# Patient Record
Sex: FEMALE | Race: WHITE | NOT HISPANIC OR LATINO | Employment: FULL TIME | URBAN - METROPOLITAN AREA
[De-identification: names, ages, dates, MRNs, and addresses within clinical notes are randomized per-mention and may not be internally consistent; named-entity substitution may affect disease eponyms.]

---
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
## 2022-01-01 ENCOUNTER — APPOINTMENT (OUTPATIENT)
Dept: CT IMAGING | Facility: HOSPITAL | Age: 22
End: 2022-01-01
Attending: EMERGENCY MEDICINE
Payer: COMMERCIAL

## 2022-01-01 ENCOUNTER — HOSPITAL ENCOUNTER (EMERGENCY)
Facility: HOSPITAL | Age: 22
Discharge: HOME | End: 2022-01-01
Attending: EMERGENCY MEDICINE | Admitting: EMERGENCY MEDICINE
Payer: COMMERCIAL

## 2022-01-01 VITALS
SYSTOLIC BLOOD PRESSURE: 119 MMHG | WEIGHT: 117 LBS | BODY MASS INDEX: 22.29 KG/M2 | RESPIRATION RATE: 18 BRPM | OXYGEN SATURATION: 97 % | TEMPERATURE: 97.1 F | HEART RATE: 87 BPM | DIASTOLIC BLOOD PRESSURE: 81 MMHG

## 2022-01-01 DIAGNOSIS — S06.0X0A CONCUSSION WITHOUT LOSS OF CONSCIOUSNESS, INITIAL ENCOUNTER: ICD-10-CM

## 2022-01-01 PROCEDURE — 99284 EMERGENCY DEPT VISIT MOD MDM: CPT | Mod: 25

## 2022-01-01 PROCEDURE — 70450 CT HEAD/BRAIN W/O DYE: CPT

## 2022-01-01 PROCEDURE — 250N000011 HC RX IP 250 OP 636: Performed by: EMERGENCY MEDICINE

## 2022-01-01 RX ORDER — ONDANSETRON 4 MG/1
4 TABLET, ORALLY DISINTEGRATING ORAL ONCE
Status: COMPLETED | OUTPATIENT
Start: 2022-01-01 | End: 2022-01-01

## 2022-01-01 RX ADMIN — ONDANSETRON 4 MG: 4 TABLET, ORALLY DISINTEGRATING ORAL at 19:38
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
## 2022-01-02 NOTE — DISCHARGE INSTRUCTIONS
Your symptoms today appear to be due to a concussion.  Fortunately your CAT scan was normal.  Follow-up with the concussion clinic as soon as possible for further evaluation and return to the ER if you have any worsening symptoms or other concerns.

## 2022-05-15 ENCOUNTER — HOSPITAL ENCOUNTER (EMERGENCY)
Facility: CLINIC | Age: 22
Discharge: HOME | End: 2022-05-15
Attending: NURSE PRACTITIONER | Admitting: NURSE PRACTITIONER
Payer: COMMERCIAL

## 2022-05-15 VITALS
TEMPERATURE: 97.5 F | RESPIRATION RATE: 16 BRPM | BODY MASS INDEX: 22.66 KG/M2 | OXYGEN SATURATION: 98 % | WEIGHT: 120 LBS | SYSTOLIC BLOOD PRESSURE: 115 MMHG | HEIGHT: 61 IN | DIASTOLIC BLOOD PRESSURE: 85 MMHG | HEART RATE: 75 BPM

## 2022-05-15 DIAGNOSIS — H69.90 ETD (EUSTACHIAN TUBE DYSFUNCTION): ICD-10-CM

## 2022-05-15 PROCEDURE — 99213 OFFICE O/P EST LOW 20 MIN: CPT | Performed by: NURSE PRACTITIONER

## 2022-05-15 PROCEDURE — G0463 HOSPITAL OUTPT CLINIC VISIT: HCPCS | Performed by: NURSE PRACTITIONER

## 2022-05-15 RX ORDER — SERTRALINE HYDROCHLORIDE 100 MG/1
100 TABLET, FILM COATED ORAL DAILY
COMMUNITY
Start: 2022-03-25

## 2022-05-15 RX ORDER — NORETHINDRONE ACETATE AND ETHINYL ESTRADIOL AND FERROUS FUMARATE 1.5-30(21)
1 KIT ORAL DAILY
COMMUNITY
Start: 2022-05-14

## 2022-05-15 ASSESSMENT — ENCOUNTER SYMPTOMS
SHORTNESS OF BREATH: 0
TROUBLE SWALLOWING: 0
HEADACHES: 0
VOMITING: 0
LIGHT-HEADEDNESS: 0
NUMBNESS: 0
ABDOMINAL PAIN: 0
SINUS PRESSURE: 1
RHINORRHEA: 1
JOINT SWELLING: 0
MYALGIAS: 0
SINUS PAIN: 0
EYE REDNESS: 0
SORE THROAT: 0
FATIGUE: 0
FEVER: 0
ARTHRALGIAS: 0
WEAKNESS: 0
EYE DISCHARGE: 0
NAUSEA: 0
CHILLS: 0
COUGH: 0
DIZZINESS: 0

## 2022-05-15 NOTE — ED TRIAGE NOTES
Ear pain since Thursday.  Pt reports congestion, neg home covid test. Unvaccinated. Informed of no visitors due to PUI status.      Triage Assessment     Row Name 05/15/22 9969       Triage Assessment (Adult)    Airway WDL WDL       Respiratory WDL    Respiratory WDL WDL       Skin Circulation/Temperature WDL    Skin Circulation/Temperature WDL WDL       Cardiac WDL    Cardiac WDL WDL       Peripheral/Neurovascular WDL    Peripheral Neurovascular WDL WDL       Cognitive/Neuro/Behavioral WDL    Cognitive/Neuro/Behavioral WDL WDL

## 2022-05-15 NOTE — ED PROVIDER NOTES
History     Chief Complaint   Patient presents with     Otalgia     Ear pain since Thursday.      HPI  Cornelio Ramos is a 22 year old female who presents the urgent care for evaluation of bilateral ear pain for 3 days.  Left is worse than right.  Accompanying nasal congestion and rhinorrhea.  Negative home COVID test.  Has not been using over-the-counter options for symptoms.  No known sick contacts.  No fever, headache, tinnitus, sore throat, cough, abdominal pain.    Allergies:  Allergies   Allergen Reactions     Amoxicillin        Problem List:    Patient Active Problem List    Diagnosis Date Noted     No significant medical problems 01/01/2022     Priority: Medium     Knee pain 04/24/2014     Priority: Medium     Short stature 09/06/2013     Priority: Medium     ADHD, predominantly inattentive type 06/29/2012     Priority: Medium     Generalized anxiety disorder 06/29/2012     Priority: Medium        Past Medical History:    Past Medical History:   Diagnosis Date     ADHD      Anxiety      Depression        Past Surgical History:    Past Surgical History:   Procedure Laterality Date     PE TUBES         Family History:    Family History   Problem Relation Age of Onset     Allergies Father         seasonal     Psoriasis Paternal Grandfather      Lupus Other         Extended maternal family members     Rheumatoid Arthritis Other         Extended maternal family members     Psoriasis Paternal Aunt      Psoriasis Cousin        Social History:  Marital Status:  Single [1]  Social History     Tobacco Use     Smoking status: Never Smoker     Smokeless tobacco: Never Used   Substance Use Topics     Alcohol use: No     Drug use: No        Medications:    albuterol (PROAIR HFA) 108 (90 BASE) MCG/ACT Inhaler  ESCITALOPRAM OXALATE PO  JUNEL FE 1.5/30 1.5-30 MG-MCG tablet  sertraline (ZOLOFT) 100 MG tablet          Review of Systems   Constitutional: Negative for chills, fatigue and fever.   HENT: Positive for  "congestion, ear pain, rhinorrhea and sinus pressure. Negative for ear discharge, sinus pain, sore throat and trouble swallowing.    Eyes: Negative for discharge and redness.   Respiratory: Negative for cough and shortness of breath.    Cardiovascular: Negative for chest pain.   Gastrointestinal: Negative for abdominal pain, nausea and vomiting.   Musculoskeletal: Negative for arthralgias, joint swelling and myalgias.   Skin: Negative for rash.   Neurological: Negative for dizziness, weakness, light-headedness, numbness and headaches.   All other systems reviewed and are negative.      Physical Exam   BP: 115/85  Pulse: 75  Temp: 97.5  F (36.4  C)  Resp: 16  Height: 154.9 cm (5' 1\")  Weight: 54.4 kg (120 lb)  SpO2: 98 %      Physical Exam  Constitutional:       General: She is not in acute distress.     Appearance: She is well-developed. She is not diaphoretic.   HENT:      Right Ear: Tympanic membrane and ear canal normal.      Left Ear: Tympanic membrane and ear canal normal.   Eyes:      Conjunctiva/sclera: Conjunctivae normal.      Pupils: Pupils are equal, round, and reactive to light.   Cardiovascular:      Rate and Rhythm: Normal rate and regular rhythm.      Pulses: Normal pulses.   Pulmonary:      Effort: Pulmonary effort is normal. No respiratory distress.      Breath sounds: Normal breath sounds and air entry. No decreased air movement. No decreased breath sounds, wheezing or rhonchi.   Musculoskeletal:         General: Normal range of motion.      Cervical back: Normal range of motion and neck supple.   Skin:     General: Skin is warm.      Capillary Refill: Capillary refill takes less than 2 seconds.   Neurological:      General: No focal deficit present.      Mental Status: She is alert and oriented to person, place, and time.   Psychiatric:         Mood and Affect: Mood normal.         ED Course                 Procedures    No results found for this or any previous visit (from the past 24 " hour(s)).    Medications - No data to display    Assessments & Plan (with Medical Decision Making)   Cornelio Ramos is a 22 year old female who presents the urgent care for evaluation of bilateral ear pain for 3 days.  Left is worse than right.  Accompanying nasal congestion and rhinorrhea. Vitals normal. No worrisome findings on physical exam, no sign of AOM. Discussed eustachian tube dysfunction. May use over the counter medications as needed and appropriate. Increase rest and hydration. Return precautions reviewed, all questions answered. Patient agreeable to plan of care and discharged in good condition.    I have reviewed the nursing notes.    I have reviewed the findings, diagnosis, plan and need for follow up with the patient.    Discharge Medication List as of 5/15/2022  3:53 PM          Final diagnoses:   ETD (eustachian tube dysfunction)       5/15/2022   [facility] EMERGENCY DEPT     Kavita Franz, APRN CNP  05/15/22 1845

## 2022-10-14 ENCOUNTER — ANESTHESIA EVENT (OUTPATIENT)
Dept: SURGERY | Facility: CLINIC | Age: 22
End: 2022-10-14
Payer: COMMERCIAL

## 2022-10-14 ENCOUNTER — ANESTHESIA (OUTPATIENT)
Dept: SURGERY | Facility: CLINIC | Age: 22
End: 2022-10-14
Payer: COMMERCIAL

## 2022-10-14 ENCOUNTER — APPOINTMENT (OUTPATIENT)
Dept: CT IMAGING | Facility: CLINIC | Age: 22
End: 2022-10-14
Attending: FAMILY MEDICINE
Payer: COMMERCIAL

## 2022-10-14 ENCOUNTER — TELEPHONE (OUTPATIENT)
Dept: SURGERY | Facility: CLINIC | Age: 22
End: 2022-10-14

## 2022-10-14 ENCOUNTER — HOSPITAL ENCOUNTER (OUTPATIENT)
Facility: CLINIC | Age: 22
Discharge: HOME | End: 2022-10-14
Attending: FAMILY MEDICINE | Admitting: SURGERY
Payer: COMMERCIAL

## 2022-10-14 VITALS
SYSTOLIC BLOOD PRESSURE: 118 MMHG | HEART RATE: 101 BPM | HEIGHT: 61 IN | OXYGEN SATURATION: 99 % | TEMPERATURE: 98.8 F | WEIGHT: 108 LBS | BODY MASS INDEX: 20.39 KG/M2 | RESPIRATION RATE: 16 BRPM | DIASTOLIC BLOOD PRESSURE: 86 MMHG

## 2022-10-14 DIAGNOSIS — K35.30 ACUTE APPENDICITIS WITH LOCALIZED PERITONITIS, WITHOUT PERFORATION, ABSCESS, OR GANGRENE: Primary | ICD-10-CM

## 2022-10-14 LAB
ALBUMIN SERPL BCG-MCNC: 4.2 G/DL (ref 3.5–5.2)
ALBUMIN UR-MCNC: NEGATIVE MG/DL
ALP SERPL-CCNC: 69 U/L (ref 35–104)
ALT SERPL W P-5'-P-CCNC: 15 U/L (ref 10–35)
ANION GAP SERPL CALCULATED.3IONS-SCNC: 11 MMOL/L (ref 7–15)
APPEARANCE UR: ABNORMAL
AST SERPL W P-5'-P-CCNC: 18 U/L (ref 10–35)
BASOPHILS # BLD AUTO: 0.1 10E3/UL (ref 0–0.2)
BASOPHILS NFR BLD AUTO: 1 %
BILIRUB SERPL-MCNC: 0.9 MG/DL
BILIRUB UR QL STRIP: NEGATIVE
BUN SERPL-MCNC: 11.4 MG/DL (ref 6–20)
CALCIUM SERPL-MCNC: 9 MG/DL (ref 8.6–10)
CHLORIDE SERPL-SCNC: 102 MMOL/L (ref 98–107)
COLOR UR AUTO: YELLOW
CREAT SERPL-MCNC: 0.78 MG/DL (ref 0.51–0.95)
DEPRECATED HCO3 PLAS-SCNC: 25 MMOL/L (ref 22–29)
EOSINOPHIL # BLD AUTO: 0.1 10E3/UL (ref 0–0.7)
EOSINOPHIL NFR BLD AUTO: 1 %
ERYTHROCYTE [DISTWIDTH] IN BLOOD BY AUTOMATED COUNT: 11.9 % (ref 10–15)
GFR SERPL CREATININE-BSD FRML MDRD: >90 ML/MIN/1.73M2
GLUCOSE SERPL-MCNC: 85 MG/DL (ref 70–99)
GLUCOSE UR STRIP-MCNC: NEGATIVE MG/DL
HCG UR QL: NEGATIVE
HCT VFR BLD AUTO: 38.8 % (ref 35–47)
HGB BLD-MCNC: 13.2 G/DL (ref 11.7–15.7)
HGB UR QL STRIP: NEGATIVE
HOLD SPECIMEN: NORMAL
IMM GRANULOCYTES # BLD: 0 10E3/UL
IMM GRANULOCYTES NFR BLD: 0 %
KETONES UR STRIP-MCNC: NEGATIVE MG/DL
LEUKOCYTE ESTERASE UR QL STRIP: ABNORMAL
LIPASE SERPL-CCNC: 29 U/L (ref 13–60)
LYMPHOCYTES # BLD AUTO: 2.2 10E3/UL (ref 0.8–5.3)
LYMPHOCYTES NFR BLD AUTO: 18 %
MCH RBC QN AUTO: 31.4 PG (ref 26.5–33)
MCHC RBC AUTO-ENTMCNC: 34 G/DL (ref 31.5–36.5)
MCV RBC AUTO: 92 FL (ref 78–100)
MONOCYTES # BLD AUTO: 0.6 10E3/UL (ref 0–1.3)
MONOCYTES NFR BLD AUTO: 5 %
MUCOUS THREADS #/AREA URNS LPF: PRESENT /LPF
NEUTROPHILS # BLD AUTO: 9.5 10E3/UL (ref 1.6–8.3)
NEUTROPHILS NFR BLD AUTO: 75 %
NITRATE UR QL: NEGATIVE
NRBC # BLD AUTO: 0 10E3/UL
NRBC BLD AUTO-RTO: 0 /100
PH UR STRIP: 6 [PH] (ref 5–7)
PLATELET # BLD AUTO: 262 10E3/UL (ref 150–450)
POTASSIUM SERPL-SCNC: 3.7 MMOL/L (ref 3.4–5.3)
PROT SERPL-MCNC: 6.8 G/DL (ref 6.4–8.3)
RBC # BLD AUTO: 4.21 10E6/UL (ref 3.8–5.2)
RBC URINE: 1 /HPF
SARS-COV-2 RNA RESP QL NAA+PROBE: NEGATIVE
SODIUM SERPL-SCNC: 138 MMOL/L (ref 136–145)
SP GR UR STRIP: 1.02 (ref 1–1.03)
SQUAMOUS EPITHELIAL: 13 /HPF
UROBILINOGEN UR STRIP-MCNC: NORMAL MG/DL
WBC # BLD AUTO: 12.4 10E3/UL (ref 4–11)
WBC URINE: 10 /HPF

## 2022-10-14 PROCEDURE — 250N000013 HC RX MED GY IP 250 OP 250 PS 637: Performed by: NURSE ANESTHETIST, CERTIFIED REGISTERED

## 2022-10-14 PROCEDURE — 710N000009 HC RECOVERY PHASE 1, LEVEL 1, PER MIN: Performed by: SURGERY

## 2022-10-14 PROCEDURE — 370N000017 HC ANESTHESIA TECHNICAL FEE, PER MIN: Performed by: SURGERY

## 2022-10-14 PROCEDURE — C9803 HOPD COVID-19 SPEC COLLECT: HCPCS | Performed by: FAMILY MEDICINE

## 2022-10-14 PROCEDURE — 710N000012 HC RECOVERY PHASE 2, PER MINUTE: Performed by: SURGERY

## 2022-10-14 PROCEDURE — 271N000001 HC OR GENERAL SUPPLY NON-STERILE: Performed by: SURGERY

## 2022-10-14 PROCEDURE — 250N000009 HC RX 250: Performed by: SURGERY

## 2022-10-14 PROCEDURE — 99221 1ST HOSP IP/OBS SF/LOW 40: CPT | Mod: 57 | Performed by: SURGERY

## 2022-10-14 PROCEDURE — 250N000011 HC RX IP 250 OP 636

## 2022-10-14 PROCEDURE — 999N000141 HC STATISTIC PRE-PROCEDURE NURSING ASSESSMENT: Performed by: SURGERY

## 2022-10-14 PROCEDURE — 96365 THER/PROPH/DIAG IV INF INIT: CPT | Performed by: FAMILY MEDICINE

## 2022-10-14 PROCEDURE — 99285 EMERGENCY DEPT VISIT HI MDM: CPT | Mod: 25 | Performed by: FAMILY MEDICINE

## 2022-10-14 PROCEDURE — 80053 COMPREHEN METABOLIC PANEL: CPT | Performed by: FAMILY MEDICINE

## 2022-10-14 PROCEDURE — 44970 LAPAROSCOPY APPENDECTOMY: CPT | Performed by: SURGERY

## 2022-10-14 PROCEDURE — 87635 SARS-COV-2 COVID-19 AMP PRB: CPT | Performed by: FAMILY MEDICINE

## 2022-10-14 PROCEDURE — 250N000009 HC RX 250: Performed by: FAMILY MEDICINE

## 2022-10-14 PROCEDURE — 250N000011 HC RX IP 250 OP 636: Performed by: NURSE ANESTHETIST, CERTIFIED REGISTERED

## 2022-10-14 PROCEDURE — 250N000011 HC RX IP 250 OP 636: Performed by: FAMILY MEDICINE

## 2022-10-14 PROCEDURE — 81001 URINALYSIS AUTO W/SCOPE: CPT | Performed by: FAMILY MEDICINE

## 2022-10-14 PROCEDURE — 96375 TX/PRO/DX INJ NEW DRUG ADDON: CPT | Performed by: FAMILY MEDICINE

## 2022-10-14 PROCEDURE — 74177 CT ABD & PELVIS W/CONTRAST: CPT

## 2022-10-14 PROCEDURE — 96366 THER/PROPH/DIAG IV INF ADDON: CPT | Performed by: FAMILY MEDICINE

## 2022-10-14 PROCEDURE — 250N000009 HC RX 250: Performed by: NURSE ANESTHETIST, CERTIFIED REGISTERED

## 2022-10-14 PROCEDURE — 81025 URINE PREGNANCY TEST: CPT | Performed by: FAMILY MEDICINE

## 2022-10-14 PROCEDURE — 85025 COMPLETE CBC W/AUTO DIFF WBC: CPT | Performed by: FAMILY MEDICINE

## 2022-10-14 PROCEDURE — 258N000003 HC RX IP 258 OP 636: Performed by: NURSE ANESTHETIST, CERTIFIED REGISTERED

## 2022-10-14 PROCEDURE — 250N000025 HC SEVOFLURANE, PER MIN: Performed by: SURGERY

## 2022-10-14 PROCEDURE — 360N000076 HC SURGERY LEVEL 3, PER MIN: Performed by: SURGERY

## 2022-10-14 PROCEDURE — 99285 EMERGENCY DEPT VISIT HI MDM: CPT | Performed by: FAMILY MEDICINE

## 2022-10-14 PROCEDURE — 83690 ASSAY OF LIPASE: CPT | Performed by: FAMILY MEDICINE

## 2022-10-14 PROCEDURE — 88304 TISSUE EXAM BY PATHOLOGIST: CPT | Mod: TC | Performed by: SURGERY

## 2022-10-14 PROCEDURE — 258N000003 HC RX IP 258 OP 636: Performed by: FAMILY MEDICINE

## 2022-10-14 PROCEDURE — 36415 COLL VENOUS BLD VENIPUNCTURE: CPT | Performed by: FAMILY MEDICINE

## 2022-10-14 PROCEDURE — 272N000001 HC OR GENERAL SUPPLY STERILE: Performed by: SURGERY

## 2022-10-14 RX ORDER — SODIUM CHLORIDE 9 MG/ML
1000 INJECTION, SOLUTION INTRAVENOUS CONTINUOUS
Status: DISCONTINUED | OUTPATIENT
Start: 2022-10-14 | End: 2022-10-24 | Stop reason: HOSPADM

## 2022-10-14 RX ORDER — HYDROMORPHONE HYDROCHLORIDE 1 MG/ML
0.5 INJECTION, SOLUTION INTRAMUSCULAR; INTRAVENOUS; SUBCUTANEOUS
Status: DISCONTINUED | OUTPATIENT
Start: 2022-10-14 | End: 2022-10-24 | Stop reason: HOSPADM

## 2022-10-14 RX ORDER — KETOROLAC TROMETHAMINE 15 MG/ML
15 INJECTION, SOLUTION INTRAMUSCULAR; INTRAVENOUS ONCE
Status: COMPLETED | OUTPATIENT
Start: 2022-10-14 | End: 2022-10-14

## 2022-10-14 RX ORDER — KETAMINE HYDROCHLORIDE 10 MG/ML
INJECTION INTRAMUSCULAR; INTRAVENOUS PRN
Status: DISCONTINUED | OUTPATIENT
Start: 2022-10-14 | End: 2022-10-14

## 2022-10-14 RX ORDER — ONDANSETRON 2 MG/ML
4 INJECTION INTRAMUSCULAR; INTRAVENOUS EVERY 30 MIN PRN
Status: DISCONTINUED | OUTPATIENT
Start: 2022-10-14 | End: 2022-10-24 | Stop reason: HOSPADM

## 2022-10-14 RX ORDER — PROPOFOL 10 MG/ML
INJECTION, EMULSION INTRAVENOUS CONTINUOUS PRN
Status: DISCONTINUED | OUTPATIENT
Start: 2022-10-14 | End: 2022-10-14

## 2022-10-14 RX ORDER — OXYCODONE HYDROCHLORIDE 5 MG/1
5-10 TABLET ORAL EVERY 4 HOURS PRN
Qty: 10 TABLET | Refills: 0 | Status: SHIPPED | OUTPATIENT
Start: 2022-10-14

## 2022-10-14 RX ORDER — FENTANYL CITRATE 50 UG/ML
50 INJECTION, SOLUTION INTRAMUSCULAR; INTRAVENOUS EVERY 5 MIN PRN
Status: DISCONTINUED | OUTPATIENT
Start: 2022-10-14 | End: 2022-10-14 | Stop reason: HOSPADM

## 2022-10-14 RX ORDER — NALOXONE HYDROCHLORIDE 0.4 MG/ML
0.4 INJECTION, SOLUTION INTRAMUSCULAR; INTRAVENOUS; SUBCUTANEOUS
Status: DISCONTINUED | OUTPATIENT
Start: 2022-10-14 | End: 2022-10-24 | Stop reason: HOSPADM

## 2022-10-14 RX ORDER — IOPAMIDOL 755 MG/ML
48 INJECTION, SOLUTION INTRAVASCULAR ONCE
Status: COMPLETED | OUTPATIENT
Start: 2022-10-14 | End: 2022-10-14

## 2022-10-14 RX ORDER — ONDANSETRON 4 MG/1
4 TABLET, ORALLY DISINTEGRATING ORAL EVERY 30 MIN PRN
Status: DISCONTINUED | OUTPATIENT
Start: 2022-10-14 | End: 2022-10-24 | Stop reason: HOSPADM

## 2022-10-14 RX ORDER — ONDANSETRON 2 MG/ML
4 INJECTION INTRAMUSCULAR; INTRAVENOUS ONCE
Status: COMPLETED | OUTPATIENT
Start: 2022-10-14 | End: 2022-10-14

## 2022-10-14 RX ORDER — PROPOFOL 10 MG/ML
INJECTION, EMULSION INTRAVENOUS PRN
Status: DISCONTINUED | OUTPATIENT
Start: 2022-10-14 | End: 2022-10-14

## 2022-10-14 RX ORDER — ACETAMINOPHEN 325 MG/1
975 TABLET ORAL ONCE
Status: COMPLETED | OUTPATIENT
Start: 2022-10-14 | End: 2022-10-14

## 2022-10-14 RX ORDER — CEFTRIAXONE 2 G/1
2 INJECTION, POWDER, FOR SOLUTION INTRAMUSCULAR; INTRAVENOUS ONCE
Status: COMPLETED | OUTPATIENT
Start: 2022-10-14 | End: 2022-10-14

## 2022-10-14 RX ORDER — BUPIVACAINE HYDROCHLORIDE AND EPINEPHRINE 5; 5 MG/ML; UG/ML
INJECTION, SOLUTION PERINEURAL PRN
Status: DISCONTINUED | OUTPATIENT
Start: 2022-10-14 | End: 2022-10-14 | Stop reason: HOSPADM

## 2022-10-14 RX ORDER — SODIUM CHLORIDE, SODIUM LACTATE, POTASSIUM CHLORIDE, CALCIUM CHLORIDE 600; 310; 30; 20 MG/100ML; MG/100ML; MG/100ML; MG/100ML
INJECTION, SOLUTION INTRAVENOUS CONTINUOUS
Status: DISCONTINUED | OUTPATIENT
Start: 2022-10-14 | End: 2022-10-24 | Stop reason: HOSPADM

## 2022-10-14 RX ORDER — PHENYLEPHRINE HYDROCHLORIDE 10 MG/ML
INJECTION INTRAVENOUS PRN
Status: DISCONTINUED | OUTPATIENT
Start: 2022-10-14 | End: 2022-10-14

## 2022-10-14 RX ORDER — LIDOCAINE HYDROCHLORIDE 10 MG/ML
INJECTION, SOLUTION INFILTRATION; PERINEURAL PRN
Status: DISCONTINUED | OUTPATIENT
Start: 2022-10-14 | End: 2022-10-14

## 2022-10-14 RX ORDER — FENTANYL CITRATE 50 UG/ML
50 INJECTION, SOLUTION INTRAMUSCULAR; INTRAVENOUS
Status: DISCONTINUED | OUTPATIENT
Start: 2022-10-14 | End: 2022-10-24 | Stop reason: HOSPADM

## 2022-10-14 RX ORDER — DEXAMETHASONE SODIUM PHOSPHATE 4 MG/ML
INJECTION, SOLUTION INTRA-ARTICULAR; INTRALESIONAL; INTRAMUSCULAR; INTRAVENOUS; SOFT TISSUE PRN
Status: DISCONTINUED | OUTPATIENT
Start: 2022-10-14 | End: 2022-10-14

## 2022-10-14 RX ORDER — HYDROMORPHONE HCL IN WATER/PF 6 MG/30 ML
0.4 PATIENT CONTROLLED ANALGESIA SYRINGE INTRAVENOUS EVERY 5 MIN PRN
Status: DISCONTINUED | OUTPATIENT
Start: 2022-10-14 | End: 2022-10-14 | Stop reason: HOSPADM

## 2022-10-14 RX ORDER — OXYCODONE HYDROCHLORIDE 5 MG/1
5 TABLET ORAL EVERY 4 HOURS PRN
Status: DISCONTINUED | OUTPATIENT
Start: 2022-10-14 | End: 2022-10-24 | Stop reason: HOSPADM

## 2022-10-14 RX ORDER — METRONIDAZOLE 500 MG/100ML
500 INJECTION, SOLUTION INTRAVENOUS ONCE
Status: COMPLETED | OUTPATIENT
Start: 2022-10-14 | End: 2022-10-14

## 2022-10-14 RX ORDER — MAGNESIUM SULFATE HEPTAHYDRATE 40 MG/ML
2 INJECTION, SOLUTION INTRAVENOUS ONCE
Status: COMPLETED | OUTPATIENT
Start: 2022-10-14 | End: 2022-10-14

## 2022-10-14 RX ORDER — GABAPENTIN 300 MG/1
300 CAPSULE ORAL
Status: COMPLETED | OUTPATIENT
Start: 2022-10-14 | End: 2022-10-14

## 2022-10-14 RX ORDER — SODIUM CHLORIDE, SODIUM LACTATE, POTASSIUM CHLORIDE, CALCIUM CHLORIDE 600; 310; 30; 20 MG/100ML; MG/100ML; MG/100ML; MG/100ML
INJECTION, SOLUTION INTRAVENOUS CONTINUOUS
Status: DISCONTINUED | OUTPATIENT
Start: 2022-10-14 | End: 2022-10-14 | Stop reason: HOSPADM

## 2022-10-14 RX ORDER — FENTANYL CITRATE 50 UG/ML
INJECTION, SOLUTION INTRAMUSCULAR; INTRAVENOUS PRN
Status: DISCONTINUED | OUTPATIENT
Start: 2022-10-14 | End: 2022-10-14

## 2022-10-14 RX ORDER — ONDANSETRON 2 MG/ML
INJECTION INTRAMUSCULAR; INTRAVENOUS PRN
Status: DISCONTINUED | OUTPATIENT
Start: 2022-10-14 | End: 2022-10-14

## 2022-10-14 RX ORDER — GLYCOPYRROLATE 0.2 MG/ML
INJECTION, SOLUTION INTRAMUSCULAR; INTRAVENOUS PRN
Status: DISCONTINUED | OUTPATIENT
Start: 2022-10-14 | End: 2022-10-14

## 2022-10-14 RX ORDER — NALOXONE HYDROCHLORIDE 0.4 MG/ML
0.2 INJECTION, SOLUTION INTRAMUSCULAR; INTRAVENOUS; SUBCUTANEOUS
Status: DISCONTINUED | OUTPATIENT
Start: 2022-10-14 | End: 2022-10-24 | Stop reason: HOSPADM

## 2022-10-14 RX ORDER — LIDOCAINE 40 MG/G
CREAM TOPICAL
Status: DISCONTINUED | OUTPATIENT
Start: 2022-10-14 | End: 2022-10-14 | Stop reason: HOSPADM

## 2022-10-14 RX ADMIN — KETAMINE HYDROCHLORIDE 20 MG: 10 INJECTION, SOLUTION INTRAMUSCULAR; INTRAVENOUS at 14:51

## 2022-10-14 RX ADMIN — ACETAMINOPHEN 975 MG: 325 TABLET, FILM COATED ORAL at 13:19

## 2022-10-14 RX ADMIN — ROCURONIUM BROMIDE 50 MG: 50 INJECTION, SOLUTION INTRAVENOUS at 14:19

## 2022-10-14 RX ADMIN — ONDANSETRON 4 MG: 2 INJECTION INTRAMUSCULAR; INTRAVENOUS at 14:48

## 2022-10-14 RX ADMIN — METRONIDAZOLE 500 MG: 500 INJECTION, SOLUTION INTRAVENOUS at 09:39

## 2022-10-14 RX ADMIN — KETOROLAC TROMETHAMINE 15 MG: 15 INJECTION, SOLUTION INTRAMUSCULAR; INTRAVENOUS at 08:24

## 2022-10-14 RX ADMIN — GLYCOPYRROLATE 0.2 MG: 0.2 INJECTION, SOLUTION INTRAMUSCULAR; INTRAVENOUS at 15:17

## 2022-10-14 RX ADMIN — SODIUM CHLORIDE 1000 ML: 9 INJECTION, SOLUTION INTRAVENOUS at 08:24

## 2022-10-14 RX ADMIN — IOPAMIDOL 48 ML: 755 INJECTION, SOLUTION INTRAVENOUS at 08:34

## 2022-10-14 RX ADMIN — KETAMINE HYDROCHLORIDE 30 MG: 10 INJECTION, SOLUTION INTRAMUSCULAR; INTRAVENOUS at 14:39

## 2022-10-14 RX ADMIN — SODIUM CHLORIDE 53 ML: 9 INJECTION, SOLUTION INTRAVENOUS at 08:34

## 2022-10-14 RX ADMIN — PROPOFOL 150 MCG/KG/MIN: 10 INJECTION, EMULSION INTRAVENOUS at 14:18

## 2022-10-14 RX ADMIN — PROPOFOL 50 MG: 10 INJECTION, EMULSION INTRAVENOUS at 14:41

## 2022-10-14 RX ADMIN — ONDANSETRON 4 MG: 2 INJECTION INTRAMUSCULAR; INTRAVENOUS at 09:34

## 2022-10-14 RX ADMIN — MIDAZOLAM 2 MG: 1 INJECTION INTRAMUSCULAR; INTRAVENOUS at 14:10

## 2022-10-14 RX ADMIN — FENTANYL CITRATE 100 MCG: 50 INJECTION, SOLUTION INTRAMUSCULAR; INTRAVENOUS at 14:17

## 2022-10-14 RX ADMIN — SUGAMMADEX 400 MG: 100 INJECTION, SOLUTION INTRAVENOUS at 14:55

## 2022-10-14 RX ADMIN — PROPOFOL 50 MG: 10 INJECTION, EMULSION INTRAVENOUS at 14:58

## 2022-10-14 RX ADMIN — PROPOFOL 200 MG: 10 INJECTION, EMULSION INTRAVENOUS at 14:17

## 2022-10-14 RX ADMIN — OXYCODONE HYDROCHLORIDE 5 MG: 5 TABLET ORAL at 16:08

## 2022-10-14 RX ADMIN — HYDROMORPHONE HYDROCHLORIDE 0.5 MG: 1 INJECTION, SOLUTION INTRAMUSCULAR; INTRAVENOUS; SUBCUTANEOUS at 12:20

## 2022-10-14 RX ADMIN — GABAPENTIN 300 MG: 300 CAPSULE ORAL at 13:22

## 2022-10-14 RX ADMIN — MAGNESIUM SULFATE HEPTAHYDRATE 2 G: 40 INJECTION, SOLUTION INTRAVENOUS at 14:26

## 2022-10-14 RX ADMIN — PHENYLEPHRINE HYDROCHLORIDE 100 MCG: 10 INJECTION INTRAVENOUS at 14:27

## 2022-10-14 RX ADMIN — HYDROMORPHONE HYDROCHLORIDE 0.5 MG: 1 INJECTION, SOLUTION INTRAMUSCULAR; INTRAVENOUS; SUBCUTANEOUS at 09:36

## 2022-10-14 RX ADMIN — DEXAMETHASONE SODIUM PHOSPHATE 8 MG: 4 INJECTION, SOLUTION INTRA-ARTICULAR; INTRALESIONAL; INTRAMUSCULAR; INTRAVENOUS; SOFT TISSUE at 14:29

## 2022-10-14 RX ADMIN — LIDOCAINE HYDROCHLORIDE 50 MG: 10 INJECTION, SOLUTION INFILTRATION; PERINEURAL at 14:17

## 2022-10-14 RX ADMIN — CEFTRIAXONE 2 G: 2 INJECTION, POWDER, FOR SOLUTION INTRAMUSCULAR; INTRAVENOUS at 11:45

## 2022-10-14 RX ADMIN — PHENYLEPHRINE HYDROCHLORIDE 50 MCG: 10 INJECTION INTRAVENOUS at 14:38

## 2022-10-14 RX ADMIN — SODIUM CHLORIDE, POTASSIUM CHLORIDE, SODIUM LACTATE AND CALCIUM CHLORIDE: 600; 310; 30; 20 INJECTION, SOLUTION INTRAVENOUS at 14:12

## 2022-10-14 ASSESSMENT — ACTIVITIES OF DAILY LIVING (ADL)
ADLS_ACUITY_SCORE: 35

## 2022-10-14 ASSESSMENT — ENCOUNTER SYMPTOMS
COUGH: 0
SORE THROAT: 0
WHEEZING: 0
DYSURIA: 0
FEVER: 0
VOMITING: 0
DIARRHEA: 0
SINUS PRESSURE: 0
BLOOD IN STOOL: 0
CONSTIPATION: 0
NAUSEA: 0
PALPITATIONS: 0
SHORTNESS OF BREATH: 0
FREQUENCY: 0
CHILLS: 0
HEADACHES: 0
ABDOMINAL PAIN: 1
DIAPHORESIS: 0

## 2022-10-14 NOTE — OP NOTE
Date of Service: 10/14/2022     STAFF SURGEON:  Shawn Gonzalez MD     ASSISTANT:  None.     PREOPERATIVE DIAGNOSIS:  Acute appendicitis.     POSTOPERATIVE DIAGNOSIS:  Acute appendicitis.     NAME OF PROCEDURE:  Laparoscopic appendectomy.     INDICATIONS FOR PROCEDURE:  The patient is a 23yo female with acute onsent RLQ pain last evening that worsened this morning. In ER had leukocytosis and CT confirmed acute appendicitis without complication. I offered appendectomy. Risks, benefits and alternatives discussed and consent obtained.     TYPE OF ANESTHESIA:  General.     COMPLICATIONS:  None.     ESTIMATED BLOOD LOSS:  10 mL.     DRAINS:  None.     SPECIMENS:  Appendix.     IMPLANTS:  None.     OPERATIVE FINDINGS:  The patient's appendix appeared acutely inflamed and dilated towards the base, more normal distally. No signs of perforation.     PROCEDURE DETAIL:  Following consent, the patient was brought from the preoperative holding area to the operating suite and laid in supine position and underwent general anesthesia with endotracheal intubation without difficulty.  The abdomen was prepped and draped in the usual fashion and then a timeout procedure was performed.  The patient received a dose of preoperative antibiotics, had sequentials for DVT prophylaxis.  Following the timeout, I made  an approximately 2 cm infraumbilical incision, elevated the abdominal fascia then between two Kochers and divided with a curved Garcia scissor and peritoneal access obtained.  I placed two 0 Vicryl stay sutures on either side of the fascial defect and inserted the Valeria cannula.  This was secured and attached to gas insufflation and pneumoperitoneum achieved without difficulty.  I then inserted a 5 mm 30-degree scope in the abdomen.  Inspection  showed the appendix as noted above.  Brief inspection around the abdomen, no other pathologies were noted.  I then placed additional 5 mm working ports, one suprapubic in midline and the  other in the left lower quadrant.  The patient was then  positioned Trendelenburg and rotated to the left.  I then grasped and elevated the appendix and used a Maryland grasper to dissect a window around the base through the mesoappendix.  The base of the appendix was divided with a single firing of a blue load 45 mm laparoscopic stapler.  The mesoappendix was then divided with a single firing of white load 45 mm laparoscopic stapler. 2 5mm clips placed on the mesenteric staple line for minor bleeding there which controlled it. The appendix was freed, it was placed in an EndoCatch bag and removed from the abdomen and passed off for pathology.  Inspection of the staple lines showed that they were intact.  I irrigated the area until clear.  The working ports were removed under direct vision, followed by removal of the Valeria and desufflation of the abdomen.  The fascial defect closed using my previously placed stay sutures and an additional Vicryl figure-of-eight placed between.  Skin was closed using 4-0 Monocryl.  I injected 0.5% Marcaine with epinephrine 1:200,000 along the incisions for local anesthetic and incisions were then covered with Dermabond.  The patient tolerated the procedure well and was discharged from the operating room in stable condition.           Shawn Gonzalez MD

## 2022-10-14 NOTE — PROGRESS NOTES
[place] [facility] DISCHARGE NOTE    Patient discharged to home at 4:46 PM via wheel chair. Accompanied by mother and staff. Discharge instructions reviewed with patient and Mom, opportunity offered to ask questions. Prescriptions filled and sent with patient upon discharge. All belongings sent with patient.    Inocencia Sarah RN

## 2022-10-14 NOTE — TELEPHONE ENCOUNTER
Type of surgery: APPENDECTOMY, LAPAROSCOPIC (N/A)      Location of surgery: [facility]  Date and time of surgery: 10/14/2022  Surgeon: JHONATAN  Pre-Op Appt Date: 10/14/2022 SEEN ON CALL  Post-Op Appt Date: TBD   Packet sent out: No  Pre-cert/Authorization completed:  No  Date:

## 2022-10-14 NOTE — ANESTHESIA PROCEDURE NOTES
Airway       Patient location during procedure: OR       Procedure Start/Stop Times: 10/14/2022 2:21 PM  Staff -        CRNA: Yuliya Palacios APRN CRNA       Other Anesthesia Staff: Valerie Montez RN       Performed By: CRNA and SRNA  Consent for Airway        Urgency: elective  Indications and Patient Condition       Indications for airway management: stacey-procedural       Induction type:intravenous       Mask difficulty assessment: 1 - vent by mask    Final Airway Details       Final airway type: endotracheal airway       Successful airway: ETT - single  Endotracheal Airway Details        ETT size (mm): 7.0       Cuffed: yes       Cuff volume (mL): 8       Successful intubation technique: video laryngoscopy       VL Blade Size: Mckeon 3       Grade View of Cords: 1       Adjucts: stylet       Position: Right       Measured from: lips       Secured at (cm): 20       Bite block used: None    Post intubation assessment        Placement verified by: capnometry, equal breath sounds and chest rise        Number of attempts at approach: 1       Number of other approaches attempted: 0       Secured with: silk tape       Ease of procedure: easy       Dentition: Intact and Unchanged    Medication(s) Administered   Medication Administration Time: 10/14/2022 2:21 PM

## 2022-10-14 NOTE — ANESTHESIA CARE TRANSFER NOTE
Patient: Cornelio Ramos    Procedure: Procedure(s):  APPENDECTOMY, LAPAROSCOPIC       Diagnosis: Acute appendicitis with localized peritonitis, without perforation, abscess, or gangrene [K35.30]  Diagnosis Additional Information: No value filed.    Anesthesia Type:   General     Note:    Oropharynx: oropharynx clear of all foreign objects and spontaneously breathing  Level of Consciousness: awake and drowsy  Oxygen Supplementation: face mask  Level of Supplemental Oxygen (L/min / FiO2): 6  Independent Airway: airway patency satisfactory and stable  Dentition: dentition unchanged  Vital Signs Stable: post-procedure vital signs reviewed and stable  Report to RN Given: handoff report given  Patient transferred to: PACU    Handoff Report: Identifed the Patient, Identified the Reponsible Provider, Reviewed the pertinent medical history, Discussed the surgical course, Reviewed Intra-OP anesthesia mangement and issues during anesthesia, Set expectations for post-procedure period and Allowed opportunity for questions and acknowledgement of understanding      Vitals:  Vitals Value Taken Time   /65 10/14/22 1521   Temp     Pulse 121 10/14/22 1526   Resp 69 10/14/22 1526   SpO2 99 % 10/14/22 1526   Vitals shown include unvalidated device data.    Electronically Signed By: Valerie Montez RN  October 14, 2022  3:28 PM

## 2022-10-14 NOTE — ANESTHESIA PREPROCEDURE EVALUATION
Anesthesia Pre-Procedure Evaluation    Patient: Cornelio Ramos   MRN: [id number] : 2000        Procedure : Procedure(s):  APPENDECTOMY, LAPAROSCOPIC          Past Medical History:   Diagnosis Date     ADHD      Anxiety      Depression       Past Surgical History:   Procedure Laterality Date     PE TUBES        Allergies   Allergen Reactions     Amoxicillin       Social History     Tobacco Use     Smoking status: Never     Smokeless tobacco: Never   Substance Use Topics     Alcohol use: No      Wt Readings from Last 1 Encounters:   10/14/22 49 kg (108 lb)        Anesthesia Evaluation   Pt has had prior anesthetic. Type: General and MAC.    No history of anesthetic complications       ROS/MED HX  ENT/Pulmonary:  - neg pulmonary ROS     Neurologic:  - neg neurologic ROS     Cardiovascular:  - neg cardiovascular ROS     METS/Exercise Tolerance:     Hematologic:  - neg hematologic  ROS     Musculoskeletal:  - neg musculoskeletal ROS     GI/Hepatic:  - neg GI/hepatic ROS     Renal/Genitourinary:  - neg Renal ROS     Endo:  - neg endo ROS     Psychiatric/Substance Use:     (+) psychiatric history depression     Infectious Disease:  - neg infectious disease ROS     Malignancy:  - neg malignancy ROS     Other:            Physical Exam    Airway  airway exam normal      Mallampati: II   TM distance: > 3 FB   Neck ROM: full   Mouth opening: > 3 cm    Respiratory Devices and Support         Dental  no notable dental history         Cardiovascular   cardiovascular exam normal          Pulmonary   pulmonary exam normal                OUTSIDE LABS:  CBC:   Lab Results   Component Value Date    WBC 12.4 (H) 10/14/2022    WBC 11.7 2005    HGB 13.2 10/14/2022    HGB 13.5 2005    HCT 38.8 10/14/2022    HCT 41.4 2005     10/14/2022     2005     BMP:   Lab Results   Component Value Date     10/14/2022     2013    POTASSIUM 3.7 10/14/2022    POTASSIUM 3.9 2013     CHLORIDE 102 10/14/2022    CHLORIDE 103 08/26/2013    CO2 25 10/14/2022    CO2 22 08/26/2013    BUN 11.4 10/14/2022    BUN 11 08/26/2013    CR 0.78 10/14/2022    CR 0.40 08/26/2013    GLC 85 10/14/2022    GLC 92 08/26/2013     COAGS: No results found for: PTT, INR, FIBR  POC:   Lab Results   Component Value Date     (H) 10/08/2013    HCG Negative 10/14/2022     HEPATIC:   Lab Results   Component Value Date    ALBUMIN 4.2 10/14/2022    PROTTOTAL 6.8 10/14/2022    ALT 15 10/14/2022    AST 18 10/14/2022    ALKPHOS 69 10/14/2022    BILITOTAL 0.9 10/14/2022     OTHER:   Lab Results   Component Value Date    TATI 9.0 10/14/2022    LIPASE 29 10/14/2022    TSH 0.57 08/26/2013    T4 1.18 08/26/2013    CRP <5.0 08/26/2013    SED 8 08/26/2013       Anesthesia Plan    ASA Status:  2   NPO Status:  NPO Appropriate    Anesthesia Type: General.     - Airway: ETT   Induction: Intravenous, Propofol.   Maintenance: Balanced.        Consents    Anesthesia Plan(s) and associated risks, benefits, and realistic alternatives discussed. Questions answered and patient/representative(s) expressed understanding.     - Discussed: Risks, Benefits and Alternatives for BOTH SEDATION and the PROCEDURE were discussed     - Discussed with:  Patient      - Extended Intubation/Ventilatory Support Discussed: No.      - Patient is DNR/DNI Status: No    Use of blood products discussed: No .     Postoperative Care    Pain management: IV analgesics, Oral pain medications.   PONV prophylaxis: Ondansetron (or other 5HT-3), Dexamethasone or Solumedrol     Comments:                ANA Downs CRNA

## 2022-10-14 NOTE — CONSULTS
[facility] Surgery Consultation    Cornelio Ramos MRN# [id number]   Age: 22 year old YOB: 2000     Date of Admission:  10/14/2022    Reason for consult: Appendicitis       Requesting physician: Angel Escalante       Level of consult: Consult, place orders and assume complete care of the patient           Assessment and Plan:   Assessment:   Appendicitis      Plan:   Appendectomy             Chief Complaint:   Abdominal pain, right lower quadrant     History is obtained from the patient    Patient started having abdominal pain prior to bed last night, persisted through the night and worsening. Poor sleep. This morning pain was bad enough that was difficult to walk. Presented to ER. Some associated nausea, no vomiting or fever.          Past Medical History:     Past Medical History:   Diagnosis Date     ADHD      Anxiety      Depression              Past Surgical History:     Past Surgical History:   Procedure Laterality Date     PE TUBES     removal of extra breast tissue from axilla          Social History:     Social History     Tobacco Use     Smoking status: Never     Smokeless tobacco: Never   Substance Use Topics     Alcohol use: No             Family History:     Family History   Problem Relation Age of Onset     Allergies Father         seasonal     Psoriasis Paternal Grandfather      Lupus Other         Extended maternal family members     Rheumatoid Arthritis Other         Extended maternal family members     Psoriasis Paternal Aunt      Psoriasis Cousin      Family history not discussed             Allergies:     Allergies   Allergen Reactions     Amoxicillin              Medications:     Medications Prior to Admission   Medication Sig Dispense Refill Last Dose     JUNEL FE 1.5/30 1.5-30 MG-MCG tablet Take 1 tablet by mouth daily   10/13/2022 at 2200     sertraline (ZOLOFT) 100 MG tablet Take 100 mg by mouth daily   10/13/2022 at 2200     albuterol (PROAIR HFA) 108 (90 BASE) MCG/ACT  Inhaler Inhale 2 puffs into the lungs every 4 hours as needed for shortness of breath / dyspnea 1 Inhaler 0      ESCITALOPRAM OXALATE PO Take 10 mg by mouth daily                Review of Systems:   The Review of Systems is negative other than noted in the HPI          Physical Exam:   Vitals were reviewed  Temp: 98.8  F (37.1  C) Temp src: Oral BP: 92/64 Pulse: 61   Resp: 16 SpO2: 98 %      Abdomen:   no scars,  soft, non-distended, tenderness noted in the right lower quadrant, rebound tenderness absent and voluntary guarding present, positive rosvings     Constitutional:   awake, alert, cooperative, no apparent distress, and appears stated age     Lungs:   No increased work of breathing, good air exchange, clear to auscultation bilaterally, no crackles or wheezing     Cardiovascular:   regular rate and rhythm     Musculoskeletal:   full range of motion noted     Skin:   normal skin color, texture, turgor             Data:   All laboratory data reviewed  Lab Results   Component Value Date    WBC 12.4 (H) 10/14/2022    HGB 13.2 10/14/2022    HCT 38.8 10/14/2022     10/14/2022     10/14/2022    POTASSIUM 3.7 10/14/2022    CHLORIDE 102 10/14/2022    CO2 25 10/14/2022    BUN 11.4 10/14/2022    CR 0.78 10/14/2022    GLC 85 10/14/2022    SED 8 08/26/2013    AST 18 10/14/2022    ALT 15 10/14/2022    ALKPHOS 69 10/14/2022    BILITOTAL 0.9 10/14/2022     All imaging studies reviewed by me.  CT ABDOMEN/PELVIS WITH CONTRAST October 14, 2022 8:46 AM     CLINICAL HISTORY: Right lower quadrant abdomen pain, acute onset.     TECHNIQUE: CT scan of the abdomen and pelvis was performed following  injection of IV contrast. Multiplanar reformats were obtained. Dose  reduction techniques were used.  CONTRAST: 48mL of Isovue 370.     COMPARISON: None.     FINDINGS:   LOWER CHEST: Normal.     HEPATOBILIARY: A round 12 mm low-density observation in the anterior  aspect of the liver along the ligament of teres (2-46) most  likely  represents focal fatty change versus a cyst or possibly hemangioma. No  follow-up is necessary. Cholelithiasis is present. No gallbladder wall  thickening or pericholecystic fluid. No bile duct dilation.      PANCREAS: Normal.     SPLEEN: Normal.     ADRENAL GLANDS: Normal.     KIDNEYS/BLADDER: A round simple-appearing low-density cyst measuring  1.1 cm is seen on the right. No follow-up is necessary. No solid  enhancing renal mass. No nephrolithiasis or hydronephrosis. Urinary  bladder is normal-appearing.     BOWEL: The appendix is mildly dilated at 11 mm in diameter. There is  mild increased enhancement of the wall of the appendix along with mild  periappendiceal fat stranding. No periappendiceal fluid collection or  abscess. No signs of perforation.     PELVIC ORGANS: Normal.     ADDITIONAL FINDINGS: There is a small amount of nonspecific free  pelvic fluid in the cul-de-sac, which may be physiologic or reactive  in nature.     MUSCULOSKELETAL: Normal.                                                                      IMPRESSION:   1.  Acute uncomplicated appendicitis.  2.  Cholelithiasis without evidence of acute cholecystitis.  3.  Small volume nonspecific free pelvic fluid.     Attestation:  Amount of time performed on this consult: 20 minutes.    Shawn Gonzalez MD

## 2022-10-14 NOTE — ANESTHESIA POSTPROCEDURE EVALUATION
Patient: Cornelio Ramos    Procedure: Procedure(s):  APPENDECTOMY, LAPAROSCOPIC       Anesthesia Type:  General    Note:  Disposition: Outpatient   Postop Pain Control: Uneventful            Sign Out: Well controlled pain   PONV: No   Neuro/Psych: Uneventful            Sign Out: Acceptable/Baseline neuro status   Airway/Respiratory: Uneventful            Sign Out: Acceptable/Baseline resp. status   CV/Hemodynamics: Uneventful            Sign Out: Acceptable CV status; No obvious hypovolemia; No obvious fluid overload   Other NRE: NONE   DID A NON-ROUTINE EVENT OCCUR? No           Last vitals:  Vitals Value Taken Time   /65 10/14/22 1521   Temp 37.1  C (98.8  F) 10/14/22 1521   Pulse 101 10/14/22 1538   Resp 12 10/14/22 1538   SpO2 99 % 10/14/22 1538   Vitals shown include unvalidated device data.    Electronically Signed By: Pee Ware CRNA, APRN CRNA  October 14, 2022  4:41 PM

## 2022-10-14 NOTE — ED TRIAGE NOTES
Pt presents with RLQ pain that woke pt from sleep. Denies previous abdominal surgeries, hx of kidney, ovary concerns.      Triage Assessment     Row Name 10/14/22 0719       Triage Assessment (Adult)    Airway WDL WDL       Respiratory WDL    Respiratory WDL WDL       Skin Circulation/Temperature WDL    Skin Circulation/Temperature WDL WDL       Cardiac WDL    Cardiac WDL WDL       Peripheral/Neurovascular WDL    Peripheral Neurovascular WDL WDL       Cognitive/Neuro/Behavioral WDL    Cognitive/Neuro/Behavioral WDL WDL

## 2022-10-14 NOTE — ED PROVIDER NOTES
History     Chief Complaint   Patient presents with     Abdominal Pain     HPI  Cornelio Ramos is a 22 year old female who presents with a history of attention deficit disorder generalized anxiety.  No prior abdominal surgery.  She uses vaping and occasional marijuana rare alcohol.  No other substances.  Sexually active does not suspect pregnancy she does use oral contraceptives.  She has no history of abdominal surgery presents here for cute onset right lower quadrant abdominal pain onset overnight.  Pain is worse with the bumps in the road as she came here this morning.  Feels more fatigued.  No obvious fever.  No cough congestion chest pain shortness of breath or other systemic symptoms.  Stools were normal last one was yesterday.  No blood in the stool or black tarry stools.  No dysuria urgency frequency or hematuria.  She has approximately 1 week before her menses.  No vaginal discharge.  No bleeding.    Allergies:  Allergies   Allergen Reactions     Amoxicillin        Problem List:    Patient Active Problem List    Diagnosis Date Noted     No significant medical problems 01/01/2022     Priority: Medium     Knee pain 04/24/2014     Priority: Medium     Short stature 09/06/2013     Priority: Medium     ADHD, predominantly inattentive type 06/29/2012     Priority: Medium     Generalized anxiety disorder 06/29/2012     Priority: Medium        Past Medical History:    Past Medical History:   Diagnosis Date     ADHD      Anxiety      Depression        Past Surgical History:    Past Surgical History:   Procedure Laterality Date     PE TUBES         Family History:    Family History   Problem Relation Age of Onset     Allergies Father         seasonal     Psoriasis Paternal Grandfather      Lupus Other         Extended maternal family members     Rheumatoid Arthritis Other         Extended maternal family members     Psoriasis Paternal Aunt      Psoriasis Cousin        Social History:  Marital Status:  Single  "[1]  Social History     Tobacco Use     Smoking status: Never     Smokeless tobacco: Never   Substance Use Topics     Alcohol use: No     Drug use: No        Medications:    albuterol (PROAIR HFA) 108 (90 BASE) MCG/ACT Inhaler  ESCITALOPRAM OXALATE PO  JUNEL FE 1.5/30 1.5-30 MG-MCG tablet  sertraline (ZOLOFT) 100 MG tablet          Review of Systems   Constitutional: Negative for chills, diaphoresis and fever.   HENT: Negative for ear pain, sinus pressure and sore throat.    Eyes: Negative for visual disturbance.   Respiratory: Negative for cough, shortness of breath and wheezing.    Cardiovascular: Negative for chest pain and palpitations.   Gastrointestinal: Positive for abdominal pain. Negative for blood in stool, constipation, diarrhea, nausea and vomiting.   Genitourinary: Negative for dysuria, frequency and urgency.   Skin: Negative for rash.   Neurological: Negative for headaches.   All other systems reviewed and are negative.      Physical Exam   BP: 94/70  Pulse: 86  Temp: 98.3  F (36.8  C)  Resp: 16  Height: 154.9 cm (5' 1\")  Weight: 49 kg (108 lb)  SpO2: 98 %      Physical Exam  Constitutional:       General: She is in acute distress.      Appearance: She is not diaphoretic.   HENT:      Head: Atraumatic.   Eyes:      Conjunctiva/sclera: Conjunctivae normal.   Cardiovascular:      Rate and Rhythm: Normal rate and regular rhythm.      Heart sounds: No murmur heard.  Pulmonary:      Effort: Pulmonary effort is normal. No respiratory distress.      Breath sounds: Normal breath sounds. No stridor. No wheezing or rhonchi.   Abdominal:      General: Abdomen is flat. Bowel sounds are normal. There is no distension.      Palpations: There is no mass.      Tenderness: There is abdominal tenderness in the right lower quadrant. There is guarding and rebound. There is no right CVA tenderness or left CVA tenderness. Positive signs include Rovsing's sign and McBurney's sign.      Hernia: No hernia is present. "   Musculoskeletal:      Cervical back: Neck supple.      Right lower leg: No edema.      Left lower leg: No edema.   Skin:     Coloration: Skin is not pale.      Findings: No rash.   Neurological:      General: No focal deficit present.      Mental Status: She is alert.         ED Course                 Procedures              Critical Care time:  none               Results for orders placed or performed during the hospital encounter of 10/14/22 (from the past 24 hour(s))   UA with Microscopic reflex to Culture    Specimen: Urine, Midstream   Result Value Ref Range    Color Urine Yellow Colorless, Straw, Light Yellow, Yellow    Appearance Urine Slightly Cloudy (A) Clear    Glucose Urine Negative Negative mg/dL    Bilirubin Urine Negative Negative    Ketones Urine Negative Negative mg/dL    Specific Gravity Urine 1.020 1.003 - 1.035    Blood Urine Negative Negative    pH Urine 6.0 5.0 - 7.0    Protein Albumin Urine Negative Negative mg/dL    Urobilinogen Urine Normal Normal, 2.0 mg/dL    Nitrite Urine Negative Negative    Leukocyte Esterase Urine Trace (A) Negative    Mucus Urine Present (A) None Seen /LPF    RBC Urine 1 <=2 /HPF    WBC Urine 10 (H) <=5 /HPF    Squamous Epithelials Urine 13 (H) <=1 /HPF    Narrative    Urine Culture not indicated   HCG qualitative urine   Result Value Ref Range    hCG Urine Qualitative Negative Negative   CBC with Platelets & Differential    Narrative    The following orders were created for panel order CBC with Platelets & Differential.  Procedure                               Abnormality         Status                     ---------                               -----------         ------                     CBC with platelets and d...[[id number]]  Abnormal            Final result                 Please view results for these tests on the individual orders.   Comprehensive metabolic panel   Result Value Ref Range    Sodium 138 136 - 145 mmol/L    Potassium 3.7 3.4 - 5.3 mmol/L     Chloride 102 98 - 107 mmol/L    Carbon Dioxide (CO2) 25 22 - 29 mmol/L    Anion Gap 11 7 - 15 mmol/L    Urea Nitrogen 11.4 6.0 - 20.0 mg/dL    Creatinine 0.78 0.51 - 0.95 mg/dL    Calcium 9.0 8.6 - 10.0 mg/dL    Glucose 85 70 - 99 mg/dL    Alkaline Phosphatase 69 35 - 104 U/L    AST 18 10 - 35 U/L    ALT 15 10 - 35 U/L    Protein Total 6.8 6.4 - 8.3 g/dL    Albumin 4.2 3.5 - 5.2 g/dL    Bilirubin Total 0.9 <=1.2 mg/dL    GFR Estimate >90 >60 mL/min/1.73m2   CBC with platelets and differential   Result Value Ref Range    WBC Count 12.4 (H) 4.0 - 11.0 10e3/uL    RBC Count 4.21 3.80 - 5.20 10e6/uL    Hemoglobin 13.2 11.7 - 15.7 g/dL    Hematocrit 38.8 35.0 - 47.0 %    MCV 92 78 - 100 fL    MCH 31.4 26.5 - 33.0 pg    MCHC 34.0 31.5 - 36.5 g/dL    RDW 11.9 10.0 - 15.0 %    Platelet Count 262 150 - 450 10e3/uL    % Neutrophils 75 %    % Lymphocytes 18 %    % Monocytes 5 %    % Eosinophils 1 %    % Basophils 1 %    % Immature Granulocytes 0 %    NRBCs per 100 WBC 0 <1 /100    Absolute Neutrophils 9.5 (H) 1.6 - 8.3 10e3/uL    Absolute Lymphocytes 2.2 0.8 - 5.3 10e3/uL    Absolute Monocytes 0.6 0.0 - 1.3 10e3/uL    Absolute Eosinophils 0.1 0.0 - 0.7 10e3/uL    Absolute Basophils 0.1 0.0 - 0.2 10e3/uL    Absolute Immature Granulocytes 0.0 <=0.4 10e3/uL    Absolute NRBCs 0.0 10e3/uL   Lipase   Result Value Ref Range    Lipase 29 13 - 60 U/L   [facility] Draw    Narrative    The following orders were created for panel order [facility] Draw.  Procedure                               Abnormality         Status                     ---------                               -----------         ------                     Extra Red Top Tube[[id number]]                               Final result                 Please view results for these tests on the individual orders.   Extra Red Top Tube   Result Value Ref Range    Hold Specimen [facility]    CT Abdomen Pelvis w Contrast    Narrative    CT ABDOMEN/PELVIS WITH CONTRAST October 14, 2022 8:46  AM    CLINICAL HISTORY: Right lower quadrant abdomen pain, acute onset.    TECHNIQUE: CT scan of the abdomen and pelvis was performed following  injection of IV contrast. Multiplanar reformats were obtained. Dose  reduction techniques were used.  CONTRAST: 48mL of Isovue 370.    COMPARISON: None.    FINDINGS:   LOWER CHEST: Normal.    HEPATOBILIARY: A round 12 mm low-density observation in the anterior  aspect of the liver along the ligament of teres (2-46) most likely  represents focal fatty change versus a cyst or possibly hemangioma. No  follow-up is necessary. Cholelithiasis is present. No gallbladder wall  thickening or pericholecystic fluid. No bile duct dilation.     PANCREAS: Normal.    SPLEEN: Normal.    ADRENAL GLANDS: Normal.    KIDNEYS/BLADDER: A round simple-appearing low-density cyst measuring  1.1 cm is seen on the right. No follow-up is necessary. No solid  enhancing renal mass. No nephrolithiasis or hydronephrosis. Urinary  bladder is normal-appearing.    BOWEL: The appendix is mildly dilated at 11 mm in diameter. There is  mild increased enhancement of the wall of the appendix along with mild  periappendiceal fat stranding. No periappendiceal fluid collection or  abscess. No signs of perforation.    PELVIC ORGANS: Normal.    ADDITIONAL FINDINGS: There is a small amount of nonspecific free  pelvic fluid in the cul-de-sac, which may be physiologic or reactive  in nature.    MUSCULOSKELETAL: Normal.      Impression    IMPRESSION:   1.  Acute uncomplicated appendicitis.  2.  Cholelithiasis without evidence of acute cholecystitis.  3.  Small volume nonspecific free pelvic fluid.    FLORENCIA ROBLES MD         SYSTEM ID:  [id number]   Asymptomatic COVID-19 Virus (Coronavirus) by PCR Nasopharyngeal    Specimen: Nasopharyngeal; Swab   Result Value Ref Range    SARS CoV2 PCR Negative Negative    Narrative    Testing was performed using the jovana  SARS-CoV-2 & Influenza A/B Assay on the jovana  Cher[employer]  System.   This test should be ordered for the detection of SARS-COV-2 in individuals who meet SARS-CoV-2 clinical and/or epidemiological criteria. Test performance is unknown in asymptomatic patients.  This test is for in vitro diagnostic use under the FDA EUA for laboratories certified under CLIA to perform moderate and/or high complexity testing. This test has not been FDA cleared or approved.  A negative test does not rule out the presence of PCR inhibitors in the specimen or target RNA in concentration below the limit of detection for the assay. The possibility of a false negative should be considered if the patient's recent exposure or clinical presentation suggests COVID-19.  [facility] are certified under the Clinical Laboratory Improvement Amendments of 1988 (CLIA-88) as qualified to perform moderate and/or high complexity laboratory testing.       Medications   ketorolac (TORADOL) injection 15 mg (has no administration in time range)   0.9% sodium chloride BOLUS (has no administration in time range)   sodium chloride 0.9% infusion (has no administration in time range)   iopamidol (ISOVUE-370) solution 48 mL (has no administration in time range)   sodium chloride 0.9 % bag 500mL for CT scan flush use (has no administration in time range)       Assessments & Plan (with Medical Decision Making)     MDM: Cornelio Ramos is a 22 year old female who presents with abdominal pain right lower quadrant no prior abdominal surgery.  No trauma.  She has nausea without vomiting.  She has some peritoneal signs associated with right lower quadrant abdominal pain.  Suspicious for appendicitis but differential diagnosis would also include ovarian torsion albeit less likely, no history of STDs such as pelvic inflammatory disease.  Functional bowel possible.  Doubt cannabinoid hyperemesis.    Discussed positive CT with Dr. Gonzalez and general surgery.  Plans to take the patient to our in the afternoon.  Discussed  the positive results with patient.  Toradol was ineffective at relieving her pain and Dilaudid was used.  Zofran also administered.  Dr. Gonzalez recommends ceftriaxone and Flagyl if the patient's prior penicillin allergy was not anaphylactic.  I discussed this with the patient.  Did been hives at the age of 4.  Ceftriaxone and Flagyl written for.    I have reviewed the nursing notes.    I have reviewed the findings, diagnosis, plan and need for follow up with the patient.       New Prescriptions    No medications on file       Final diagnoses:   Acute appendicitis with localized peritonitis, without perforation, abscess, or gangrene       10/14/2022   [facility] EMERGENCY DEPT     Angel Escalante MD  10/14/22 2810

## 2022-10-15 ASSESSMENT — ACTIVITIES OF DAILY LIVING (ADL)
ADLS_ACUITY_SCORE: 35

## 2022-10-16 ASSESSMENT — ACTIVITIES OF DAILY LIVING (ADL)
ADLS_ACUITY_SCORE: 35

## 2022-10-17 ASSESSMENT — ACTIVITIES OF DAILY LIVING (ADL)
ADLS_ACUITY_SCORE: 35

## 2022-10-18 LAB
PATH REPORT.COMMENTS IMP SPEC: NORMAL
PATH REPORT.COMMENTS IMP SPEC: NORMAL
PATH REPORT.FINAL DX SPEC: NORMAL
PATH REPORT.GROSS SPEC: NORMAL
PATH REPORT.MICROSCOPIC SPEC OTHER STN: NORMAL
PATH REPORT.RELEVANT HX SPEC: NORMAL
PHOTO IMAGE: NORMAL

## 2022-10-18 PROCEDURE — 88304 TISSUE EXAM BY PATHOLOGIST: CPT | Mod: 26 | Performed by: PATHOLOGY

## 2022-10-18 ASSESSMENT — ACTIVITIES OF DAILY LIVING (ADL)
ADLS_ACUITY_SCORE: 35

## 2022-10-19 ASSESSMENT — ACTIVITIES OF DAILY LIVING (ADL)
ADLS_ACUITY_SCORE: 35

## 2022-10-20 ASSESSMENT — ACTIVITIES OF DAILY LIVING (ADL)
ADLS_ACUITY_SCORE: 35

## 2022-10-21 ASSESSMENT — ACTIVITIES OF DAILY LIVING (ADL)
ADLS_ACUITY_SCORE: 35

## 2022-10-22 ASSESSMENT — ACTIVITIES OF DAILY LIVING (ADL)
ADLS_ACUITY_SCORE: 35

## 2022-10-23 ASSESSMENT — ACTIVITIES OF DAILY LIVING (ADL)
ADLS_ACUITY_SCORE: 35

## 2022-10-24 ASSESSMENT — ACTIVITIES OF DAILY LIVING (ADL)
ADLS_ACUITY_SCORE: 35

## 2023-01-28 ENCOUNTER — HOSPITAL ENCOUNTER (EMERGENCY)
Facility: CLINIC | Age: 23
Discharge: HOME | End: 2023-01-28
Attending: EMERGENCY MEDICINE | Admitting: EMERGENCY MEDICINE
Payer: COMMERCIAL

## 2023-01-28 ENCOUNTER — APPOINTMENT (OUTPATIENT)
Dept: GENERAL RADIOLOGY | Facility: CLINIC | Age: 23
End: 2023-01-28
Attending: EMERGENCY MEDICINE
Payer: COMMERCIAL

## 2023-01-28 VITALS
RESPIRATION RATE: 16 BRPM | SYSTOLIC BLOOD PRESSURE: 110 MMHG | DIASTOLIC BLOOD PRESSURE: 79 MMHG | WEIGHT: 110 LBS | OXYGEN SATURATION: 96 % | TEMPERATURE: 98.4 F | HEART RATE: 96 BPM | HEIGHT: 62 IN | BODY MASS INDEX: 20.24 KG/M2

## 2023-01-28 DIAGNOSIS — S00.33XA CONTUSION OF NOSE, INITIAL ENCOUNTER: ICD-10-CM

## 2023-01-28 PROCEDURE — 99283 EMERGENCY DEPT VISIT LOW MDM: CPT | Performed by: EMERGENCY MEDICINE

## 2023-01-28 PROCEDURE — 70160 X-RAY EXAM OF NASAL BONES: CPT

## 2023-01-28 ASSESSMENT — ACTIVITIES OF DAILY LIVING (ADL): ADLS_ACUITY_SCORE: 35

## 2023-01-29 NOTE — DISCHARGE INSTRUCTIONS
Ice to areas of pain.    Tylenol and/or ibuprofen for pain.    Return to the emergency department for any problems.

## 2023-01-29 NOTE — ED PROVIDER NOTES
History     Chief Complaint   Patient presents with     Facial Injury     Injury to nose, hit by microwave door      HPI  Cornelio Ramos is a 22 year old female who presents to the emergency department complaining of a nasal injury.  She reports that she was shutting the microwave door and her face was to close the door and she struck her self in the nose.  She denies epistaxis or breathing difficulties through the nose.  She complains of pain at the nasal bridge.  She has no other complaints at this time.    Allergies:  Allergies   Allergen Reactions     Amoxicillin        Problem List:    Patient Active Problem List    Diagnosis Date Noted     No significant medical problems 01/01/2022     Priority: Medium     Knee pain 04/24/2014     Priority: Medium     Short stature 09/06/2013     Priority: Medium     ADHD, predominantly inattentive type 06/29/2012     Priority: Medium     Generalized anxiety disorder 06/29/2012     Priority: Medium        Past Medical History:    Past Medical History:   Diagnosis Date     ADHD      Anxiety      Depression        Past Surgical History:    Past Surgical History:   Procedure Laterality Date     LAPAROSCOPIC APPENDECTOMY N/A 10/14/2022    Procedure: APPENDECTOMY, LAPAROSCOPIC;  Surgeon: Shawn Gonzalez MD;  Location: [facility] OR     PE TUBES         Family History:    Family History   Problem Relation Age of Onset     Allergies Father         seasonal     Psoriasis Paternal Grandfather      Lupus Other         Extended maternal family members     Rheumatoid Arthritis Other         Extended maternal family members     Psoriasis Paternal Aunt      Psoriasis Cousin        Social History:  Marital Status:  Single [1]  Social History     Tobacco Use     Smoking status: Never     Smokeless tobacco: Never   Substance Use Topics     Alcohol use: No     Drug use: No        Medications:    albuterol (PROAIR HFA) 108 (90 BASE) MCG/ACT Inhaler  ESCITALOPRAM OXALATE PO  JUNEL FE 1.5/30  "1.5-30 MG-MCG tablet  oxyCODONE (ROXICODONE) 5 MG tablet  sertraline (ZOLOFT) 100 MG tablet          Review of Systems   All other systems reviewed and are negative.      Physical Exam   BP: 110/79  Pulse: 96  Temp: 98.4  F (36.9  C)  Resp: 16  Height: 156.8 cm (5' 1.75\")  Weight: 49.9 kg (110 lb)  SpO2: 96 %      Physical Exam  Vitals and nursing note reviewed.   Constitutional:       General: She is not in acute distress.     Appearance: She is well-developed. She is not ill-appearing or toxic-appearing.   HENT:      Head: Normocephalic and atraumatic.      Nose:      Comments: Patient has tenderness to palpation on the nasal bridge without palpable crepitus.  There is an abrasion and bruising noted.  No epistaxis or septal hematoma noted.  Eyes:      General: No scleral icterus.     Pupils: Pupils are equal, round, and reactive to light.   Cardiovascular:      Rate and Rhythm: Normal rate.   Pulmonary:      Effort: Pulmonary effort is normal. No respiratory distress.   Musculoskeletal:      Cervical back: Normal range of motion and neck supple.   Skin:     General: Skin is warm and dry.      Coloration: Skin is not pale.      Findings: No erythema or rash.   Neurological:      Mental Status: She is alert and oriented to person, place, and time.      Sensory: No sensory deficit.   Psychiatric:         Behavior: Behavior normal.         ED Course                 Procedures                  Results for orders placed or performed during the hospital encounter of 01/28/23 (from the past 24 hour(s))   XR Nasal Bones 3 Views    Narrative    EXAM: XR NASAL BONES 3 VIEWS  LOCATION: [facility]  DATE/TIME: 1/28/2023 8:44 PM    INDICATION: trauma nasal pain.  COMPARISON: None.      Impression    IMPRESSION: Negative nasal bones. No fracture. Right nasal piercing visualized. No discrete air-fluid levels within sinuses.       Medications - No data to display    Assessments & Plan (with Medical " Decision Making)     I have reviewed the nursing notes.    I have reviewed the findings, diagnosis, plan and need for follow up with the patient.  This patient presented to the emergency department complaining of nasal pain after an injury.  Midface is stable and there is no clinical indication for more significant facial bone fractures.  X-ray demonstrates no evidence of fracture.  There is no septal hematoma on exam.  Suspect nasal contusion at this time.  Patient was discharged with instructions for care and follow-up in good condition.        New Prescriptions    No medications on file       Final diagnoses:   Contusion of nose, initial encounter       1/28/2023   [facility] EMERGENCY DEPT     Noé Claire MD  01/28/23 0716

## 2023-01-29 NOTE — ED TRIAGE NOTES
Injury to nose, hit by microwave door      Triage Assessment     Row Name 01/28/23 2000       Triage Assessment (Adult)    Airway WDL WDL       Cardiac WDL    Cardiac WDL WDL       Cognitive/Neuro/Behavioral WDL    Cognitive/Neuro/Behavioral WDL WDL

## 2023-04-04 ENCOUNTER — HOSPITAL ENCOUNTER (EMERGENCY)
Facility: CLINIC | Age: 23
Discharge: HOME | End: 2023-04-04
Attending: NURSE PRACTITIONER | Admitting: NURSE PRACTITIONER
Payer: COMMERCIAL

## 2023-04-04 VITALS
SYSTOLIC BLOOD PRESSURE: 111 MMHG | TEMPERATURE: 98.2 F | RESPIRATION RATE: 18 BRPM | DIASTOLIC BLOOD PRESSURE: 69 MMHG | OXYGEN SATURATION: 99 % | HEART RATE: 92 BPM

## 2023-04-04 DIAGNOSIS — J02.9 PHARYNGITIS: ICD-10-CM

## 2023-04-04 LAB — GROUP A STREP BY PCR: NOT DETECTED

## 2023-04-04 PROCEDURE — G0463 HOSPITAL OUTPT CLINIC VISIT: HCPCS | Performed by: NURSE PRACTITIONER

## 2023-04-04 PROCEDURE — 87651 STREP A DNA AMP PROBE: CPT | Performed by: NURSE PRACTITIONER

## 2023-04-04 PROCEDURE — 99213 OFFICE O/P EST LOW 20 MIN: CPT | Performed by: NURSE PRACTITIONER

## 2023-04-04 ASSESSMENT — ENCOUNTER SYMPTOMS
ADENOPATHY: 1
NAUSEA: 0
LIGHT-HEADEDNESS: 0
HEADACHES: 0
EYE REDNESS: 0
DIZZINESS: 0
WEAKNESS: 0
EYE DISCHARGE: 0
ABDOMINAL PAIN: 0
VOMITING: 0
RHINORRHEA: 0
FEVER: 0
NUMBNESS: 0
SINUS PRESSURE: 0
TROUBLE SWALLOWING: 0
COUGH: 0
JOINT SWELLING: 0
MYALGIAS: 0
SINUS PAIN: 0
SORE THROAT: 1
ARTHRALGIAS: 0
FATIGUE: 0
SHORTNESS OF BREATH: 0
CHILLS: 0

## 2023-04-04 ASSESSMENT — ACTIVITIES OF DAILY LIVING (ADL): ADLS_ACUITY_SCORE: 35

## 2023-04-04 NOTE — ED PROVIDER NOTES
History     Chief Complaint   Patient presents with     Pharyngitis     For over a month     HPI  Cornelio Ramos is a 22 year old female who presents to the urgent care for evaluation of pharyngitis intermittently for the past month. Current illness began about 3 days ago. Denies fever, headache, dizziness, congestion, cough, shortness of breath, chest pain, abdominal pain, nausea, vomiting, diarrhea, urinary symptoms, and rash. Has scheduled general surgery follow up due to enlarged anterior cervical lymph nodes. Previous axillary lymph node removal.     Allergies:  Allergies   Allergen Reactions     Amoxicillin        Problem List:    Patient Active Problem List    Diagnosis Date Noted     No significant medical problems 01/01/2022     Priority: Medium     Knee pain 04/24/2014     Priority: Medium     Short stature 09/06/2013     Priority: Medium     ADHD, predominantly inattentive type 06/29/2012     Priority: Medium     Generalized anxiety disorder 06/29/2012     Priority: Medium        Past Medical History:    Past Medical History:   Diagnosis Date     ADHD      Anxiety      Depression        Past Surgical History:    Past Surgical History:   Procedure Laterality Date     LAPAROSCOPIC APPENDECTOMY N/A 10/14/2022    Procedure: APPENDECTOMY, LAPAROSCOPIC;  Surgeon: Shawn Gonzalez MD;  Location: [facility] OR     PE TUBES         Family History:    Family History   Problem Relation Age of Onset     Allergies Father         seasonal     Psoriasis Paternal Grandfather      Lupus Other         Extended maternal family members     Rheumatoid Arthritis Other         Extended maternal family members     Psoriasis Paternal Aunt      Psoriasis Cousin        Social History:  Marital Status:  Single [1]  Social History     Tobacco Use     Smoking status: Never     Smokeless tobacco: Never   Substance Use Topics     Alcohol use: No     Drug use: No        Medications:    albuterol (PROAIR HFA) 108 (90 BASE) MCG/ACT  Inhaler  ESCITALOPRAM OXALATE PO  JUNEL FE 1.5/30 1.5-30 MG-MCG tablet  oxyCODONE (ROXICODONE) 5 MG tablet  sertraline (ZOLOFT) 100 MG tablet          Review of Systems   Constitutional: Negative for chills, fatigue and fever.   HENT: Positive for sore throat. Negative for congestion, ear discharge, ear pain, rhinorrhea, sinus pressure, sinus pain and trouble swallowing.    Eyes: Negative for discharge and redness.   Respiratory: Negative for cough and shortness of breath.    Cardiovascular: Negative for chest pain.   Gastrointestinal: Negative for abdominal pain, nausea and vomiting.   Musculoskeletal: Negative for arthralgias, joint swelling and myalgias.   Skin: Negative for rash.   Neurological: Negative for dizziness, weakness, light-headedness, numbness and headaches.   Hematological: Positive for adenopathy.   All other systems reviewed and are negative.      Physical Exam   BP: 111/69  Pulse: 92  Temp: 98.2  F (36.8  C)  Resp: 18  SpO2: 99 %      Physical Exam  Constitutional:       General: She is not in acute distress.     Appearance: She is well-developed. She is not diaphoretic.   HENT:      Mouth/Throat:      Pharynx: Uvula midline. Posterior oropharyngeal erythema present.      Tonsils: Tonsillar exudate present. 1+ on the right. 1+ on the left.   Eyes:      Conjunctiva/sclera: Conjunctivae normal.      Pupils: Pupils are equal, round, and reactive to light.   Cardiovascular:      Rate and Rhythm: Normal rate and regular rhythm.      Pulses: Normal pulses.   Pulmonary:      Effort: Pulmonary effort is normal. No respiratory distress.      Breath sounds: Normal breath sounds and air entry. No decreased air movement. No decreased breath sounds, wheezing or rhonchi.   Abdominal:      General: There is no distension.      Palpations: Abdomen is soft.      Tenderness: There is no abdominal tenderness.   Musculoskeletal:         General: Normal range of motion.      Cervical back: Normal range of motion and  neck supple.   Lymphadenopathy:      Cervical: Cervical adenopathy present.      Right cervical: Superficial cervical adenopathy present.      Left cervical: Superficial cervical adenopathy present.   Skin:     General: Skin is warm.      Capillary Refill: Capillary refill takes less than 2 seconds.   Neurological:      General: No focal deficit present.      Mental Status: She is alert and oriented to person, place, and time.   Psychiatric:         Mood and Affect: Mood normal.         ED Course                 Procedures     Results for orders placed or performed during the hospital encounter of 04/04/23 (from the past 24 hour(s))   Group A Streptococcus PCR Throat Swab    Specimen: Throat; Swab   Result Value Ref Range    Group A strep by PCR Not Detected Not Detected    Narrative    The Xpert Xpress Strep A test, performed on the [employer], is a rapid, qualitative in vitro diagnostic test for the detection of Streptococcus pyogenes (Group A ß-hemolytic Streptococcus, Strep A) in throat swab specimens from patients with signs and symptoms of pharyngitis. The Xpert Xpress Strep A test can be used as an aid in the diagnosis of Group A Streptococcal pharyngitis. The assay is not intended to monitor treatment for Group A Streptococcus infections. The Xpert Xpress Strep A test utilizes an automated real-time polymerase chain reaction (PCR) to detect Streptococcus pyogenes DNA.       Medications - No data to display    Assessments & Plan (with Medical Decision Making)   Cornelio Ramos is a 22 year old female who presents to the urgent care for evaluation of pharyngitis intermittently for the past month. Current illness began about 3 days ago. Vital signs normal, physical exam as above. Strep test negative. Discussed likely viral etiology of symptoms and average course of viral illness. May use over the counter medications as needed and appropriate. Increase rest and hydration. Return precautions  reviewed, all questions answered. Patient agreeable to plan of care and discharged in good condition.     I have reviewed the nursing notes.    I have reviewed the findings, diagnosis, plan and need for follow up with the patient.    Discharge Medication List as of 4/4/2023  5:03 PM          Final diagnoses:   Pharyngitis       4/4/2023   [facility] EMERGENCY DEPT     Kavita Franz, ANA CNP  04/04/23 1808

## 2023-07-07 ENCOUNTER — APPOINTMENT (OUTPATIENT)
Dept: GENERAL RADIOLOGY | Facility: CLINIC | Age: 23
End: 2023-07-07
Attending: FAMILY MEDICINE
Payer: COMMERCIAL

## 2023-07-07 ENCOUNTER — HOSPITAL ENCOUNTER (EMERGENCY)
Facility: CLINIC | Age: 23
Discharge: HOME | End: 2023-07-07
Attending: FAMILY MEDICINE | Admitting: FAMILY MEDICINE
Payer: COMMERCIAL

## 2023-07-07 VITALS
BODY MASS INDEX: 19.54 KG/M2 | TEMPERATURE: 98.9 F | WEIGHT: 106 LBS | HEART RATE: 67 BPM | RESPIRATION RATE: 14 BRPM | SYSTOLIC BLOOD PRESSURE: 127 MMHG | DIASTOLIC BLOOD PRESSURE: 86 MMHG | OXYGEN SATURATION: 98 %

## 2023-07-07 DIAGNOSIS — R07.89 CHEST WALL PAIN: ICD-10-CM

## 2023-07-07 PROCEDURE — 93005 ELECTROCARDIOGRAM TRACING: CPT | Mod: 59 | Performed by: FAMILY MEDICINE

## 2023-07-07 PROCEDURE — 99285 EMERGENCY DEPT VISIT HI MDM: CPT | Mod: 25 | Performed by: FAMILY MEDICINE

## 2023-07-07 PROCEDURE — 93308 TTE F-UP OR LMTD: CPT | Mod: 26 | Performed by: FAMILY MEDICINE

## 2023-07-07 PROCEDURE — 99284 EMERGENCY DEPT VISIT MOD MDM: CPT | Mod: 25 | Performed by: FAMILY MEDICINE

## 2023-07-07 PROCEDURE — 71046 X-RAY EXAM CHEST 2 VIEWS: CPT

## 2023-07-07 PROCEDURE — 93308 TTE F-UP OR LMTD: CPT | Performed by: FAMILY MEDICINE

## 2023-07-07 PROCEDURE — 93010 ELECTROCARDIOGRAM REPORT: CPT | Mod: 59 | Performed by: FAMILY MEDICINE

## 2023-07-07 ASSESSMENT — ACTIVITIES OF DAILY LIVING (ADL): ADLS_ACUITY_SCORE: 35

## 2023-07-07 NOTE — LETTER
July 7, 2023      To Whom It May Concern:      Cornelio AHSAN Ramos was seen in our Emergency Department today, 07/07/23.        Sincerely,        Michael Knight MD

## 2023-07-08 NOTE — ED NOTES
Mid-sternal chest pain is reproducible, hurts when pressed on, Dr. Knight ok with waiting on line and labs until after he sees Pt.

## 2023-07-08 NOTE — DISCHARGE INSTRUCTIONS
RETURN TO THE EMERGENCY ROOM FOR THE FOLLOWING:    Severely worsened pain, severely worsened breathing, fainting, unrelenting palpitations, fever greater than 101, or at anytime for any concern.    FOLLOW UP:    With your primary clinic if not improved over the next 2 weeks, or back to the ED at anytime for any concern.    TREATMENT RECOMMENDATIONS:    Consider ibuprofen or Tylenol alternating for comfort as needed.    NURSE ADVICE LINE:  [phone] or [phone]

## 2023-07-08 NOTE — ED PROVIDER NOTES
HPI   Patient is a 23-year-old female presenting with chest pain.  She has a known history of anxiety and ADHD.  She takes Zoloft.  She takes birth control.  She has had an appendectomy.  Non-smoker.  No drugs of abuse.  No marijuana.  No alcohol.    Patient was at work when she had onset of central chest pressure and intermittent sharp pain.  The central chest pressure is constant.  She has radiating sharp pain toward the midline back.  She describes significant tenderness with pushing on the chest wall.  No shortness of breath.  No new cough or congestion.  No fever.  No leg pain or swelling.  No hemoptysis.  No lightheadedness or fainting.  No palpitations.  She denies regular reflux.  No abdominal or flank pain.  She does lift boxes at work.  She denies obvious injury, trauma, overuse, or new activities.  She has not had anything like this previously.        Allergies:  Allergies   Allergen Reactions     Amoxicillin      Problem List:    Patient Active Problem List    Diagnosis Date Noted     No significant medical problems 01/01/2022     Priority: Medium     Knee pain 04/24/2014     Priority: Medium     Short stature 09/06/2013     Priority: Medium     ADHD, predominantly inattentive type 06/29/2012     Priority: Medium     Generalized anxiety disorder 06/29/2012     Priority: Medium      Past Medical History:    Past Medical History:   Diagnosis Date     ADHD      Anxiety      Depression      Past Surgical History:    Past Surgical History:   Procedure Laterality Date     LAPAROSCOPIC APPENDECTOMY N/A 10/14/2022    Procedure: APPENDECTOMY, LAPAROSCOPIC;  Surgeon: Shawn Gonzalez MD;  Location: [facility] OR     PE TUBES       Family History:    Family History   Problem Relation Age of Onset     Allergies Father         seasonal     Psoriasis Paternal Grandfather      Lupus Other         Extended maternal family members     Rheumatoid Arthritis Other         Extended maternal family members     Psoriasis  Paternal Aunt      Psoriasis Cousin      Social History:  Marital Status:  Single [1]  Social History     Tobacco Use     Smoking status: Never     Smokeless tobacco: Never   Substance Use Topics     Alcohol use: No     Drug use: No      Medications:    albuterol (PROAIR HFA) 108 (90 BASE) MCG/ACT Inhaler  ESCITALOPRAM OXALATE PO  JUNEL FE 1.5/30 1.5-30 MG-MCG tablet  oxyCODONE (ROXICODONE) 5 MG tablet  sertraline (ZOLOFT) 100 MG tablet      Review of Systems   All other systems reviewed and are negative.      PE   BP: 127/86  Pulse: 73  Temp: 98.9  F (37.2  C)  Resp: 16  Weight: 48.1 kg (106 lb)  SpO2: 97 %  Physical Exam  Vitals reviewed.   Constitutional:       General: She is not in acute distress.     Appearance: She is well-developed.   HENT:      Head: Normocephalic and atraumatic.      Right Ear: External ear normal.      Left Ear: External ear normal.      Nose: Nose normal.      Mouth/Throat:      Mouth: Mucous membranes are moist.      Pharynx: Oropharynx is clear.   Eyes:      Extraocular Movements: Extraocular movements intact.      Conjunctiva/sclera: Conjunctivae normal.      Pupils: Pupils are equal, round, and reactive to light.   Cardiovascular:      Rate and Rhythm: Normal rate and regular rhythm.      Heart sounds: Normal heart sounds.   Pulmonary:      Effort: Pulmonary effort is normal.      Breath sounds: Normal breath sounds.   Chest:      Comments: Chest tenderness in the midline.  Musculoskeletal:         General: Normal range of motion.      Cervical back: Normal range of motion.   Skin:     General: Skin is warm and dry.   Neurological:      Mental Status: She is alert and oriented to person, place, and time.   Psychiatric:         Behavior: Behavior normal.         ED COURSE and [facility]   2022.  Patient has symptoms and signs as described above.  Chest x-ray pending.  Bedside ultrasound performed and unremarkable, documented below.  EKG unremarkable and documented below.  Low concern for  cardiac or vascular cause of symptoms.  If chest x-ray unremarkable, the patient will be discharged home and follow-up.  Chest wall injury/strain perhaps related to her work is likely.  Ibuprofen or Tylenol for comfort.    EKG  (1952)   Interpretation performed by me.  Rate: 65     Rhythm: sinus     Axis: nl  Intervals: NC (12-2) 128, QRS (<12) 82, QTc (>5) 404  P wave: nl     QRS complex: nl   ST segment / T-wave: nl  Conclusion: nl    Electronic medical chart reviewed, including medical problems, medications, medical allergies, social history.  Recent hospitalizations and surgical procedures reviewed.  Recent clinic visits and consultations reviewed.  Recent labs and test results reviewed.  Nursing notes reviewed.    The patient, their parent if applicable, and/or their medical decision maker(s) and I have reviewed all of the available historical information, applicable PMH, physical exam findings, and objective diagnostic data gathered during this ED visit.  We then discussed all work-up options and then together agreed upon the course taken during this visit.  The ultimate disposition and plan was a cooperative decision made between myself and the patient, their parent if applicable, and/or their legal decision maker(s).  The risks and benefits of all decisions made during this visit were discussed to the best of my abilities given the circumstances, and all parties are understanding of the pertinent ramifications of these decisions.      LABS  Labs Ordered and Resulted from Time of ED Arrival to Time of ED Departure - No data to display    IMAGING  Images reviewed by me.  Radiology report also reviewed.  XR Chest 2 Views   Final Result   IMPRESSION:       Heart size is normal. Lungs are clear bilaterally. Mediastinum and visualized bony structures are unremarkable.      POC US ECHO LIMITED   Preliminary Result   [facility] Procedure Note        Limited Bedside ED Cardiac Ultrasound:      PROCEDURE:  PERFORMED BY: Dr. Michael Knight MD   INDICATIONS/SYMPTOM:  Chest Pain   PROBE: Cardiac phased array probe   BODY LOCATION: Chest   FINDINGS:    The ultrasound was performed utilizing the parasternal long axis, parasternal short axis, and apical 4 chamber views.   Cardiac contractility:  Present   Gross estimation of cardiac kinesis: normal   Pericardial Effusion:  None   RV:LV ratio: RV > LV   INTERPRETATION:    Chamber size and motion were grossly normal with LV > RV, normal cardiac kinesis.  No pericardial effusion was found.  IVC visualized and findings indicate normovolemia.   IMAGE DOCUMENTATION: Images were archived to hard drive.                  Procedures    Medications - No data to display      IMPRESSION       ICD-10-CM    1. Chest wall pain  R07.89                Medication List      There are no discharge medications for this visit.                     Michael Knight MD  07/07/23 4437

## 2023-07-20 ENCOUNTER — APPOINTMENT (OUTPATIENT)
Dept: RADIOLOGY | Facility: HOSPITAL | Age: 23
End: 2023-07-20
Attending: STUDENT IN AN ORGANIZED HEALTH CARE EDUCATION/TRAINING PROGRAM
Payer: COMMERCIAL

## 2023-07-20 ENCOUNTER — HOSPITAL ENCOUNTER (EMERGENCY)
Facility: HOSPITAL | Age: 23
Discharge: HOME | End: 2023-07-20
Attending: STUDENT IN AN ORGANIZED HEALTH CARE EDUCATION/TRAINING PROGRAM | Admitting: STUDENT IN AN ORGANIZED HEALTH CARE EDUCATION/TRAINING PROGRAM
Payer: COMMERCIAL

## 2023-07-20 VITALS
BODY MASS INDEX: 19.45 KG/M2 | DIASTOLIC BLOOD PRESSURE: 62 MMHG | HEIGHT: 61 IN | TEMPERATURE: 97.9 F | RESPIRATION RATE: 16 BRPM | OXYGEN SATURATION: 97 % | HEART RATE: 60 BPM | WEIGHT: 103 LBS | SYSTOLIC BLOOD PRESSURE: 103 MMHG

## 2023-07-20 DIAGNOSIS — J18.9 ATYPICAL PNEUMONIA: ICD-10-CM

## 2023-07-20 DIAGNOSIS — R07.9 CHEST PAIN, UNSPECIFIED TYPE: ICD-10-CM

## 2023-07-20 LAB
ALBUMIN SERPL BCG-MCNC: 4.6 G/DL (ref 3.5–5.2)
ALP SERPL-CCNC: 95 U/L (ref 35–104)
ALT SERPL W P-5'-P-CCNC: 10 U/L (ref 0–50)
ANION GAP SERPL CALCULATED.3IONS-SCNC: 13 MMOL/L (ref 7–15)
AST SERPL W P-5'-P-CCNC: 24 U/L (ref 0–45)
BASOPHILS # BLD AUTO: 0.1 10E3/UL (ref 0–0.2)
BASOPHILS NFR BLD AUTO: 1 %
BILIRUB SERPL-MCNC: 0.8 MG/DL
BUN SERPL-MCNC: 7.4 MG/DL (ref 6–20)
CALCIUM SERPL-MCNC: 9.9 MG/DL (ref 8.6–10)
CHLORIDE SERPL-SCNC: 102 MMOL/L (ref 98–107)
CREAT SERPL-MCNC: 0.6 MG/DL (ref 0.51–0.95)
D DIMER PPP FEU-MCNC: 0.29 UG/ML FEU (ref 0–0.5)
DEPRECATED HCO3 PLAS-SCNC: 23 MMOL/L (ref 22–29)
EOSINOPHIL # BLD AUTO: 0.1 10E3/UL (ref 0–0.7)
EOSINOPHIL NFR BLD AUTO: 1 %
ERYTHROCYTE [DISTWIDTH] IN BLOOD BY AUTOMATED COUNT: 11.6 % (ref 10–15)
GFR SERPL CREATININE-BSD FRML MDRD: >90 ML/MIN/1.73M2
GLUCOSE SERPL-MCNC: 132 MG/DL (ref 70–99)
HCT VFR BLD AUTO: 39.8 % (ref 35–47)
HGB BLD-MCNC: 13.8 G/DL (ref 11.7–15.7)
HOLD SPECIMEN: NORMAL
IMM GRANULOCYTES # BLD: 0 10E3/UL
IMM GRANULOCYTES NFR BLD: 0 %
LYMPHOCYTES # BLD AUTO: 1.7 10E3/UL (ref 0.8–5.3)
LYMPHOCYTES NFR BLD AUTO: 25 %
MCH RBC QN AUTO: 30.9 PG (ref 26.5–33)
MCHC RBC AUTO-ENTMCNC: 34.7 G/DL (ref 31.5–36.5)
MCV RBC AUTO: 89 FL (ref 78–100)
MONOCYTES # BLD AUTO: 0.4 10E3/UL (ref 0–1.3)
MONOCYTES NFR BLD AUTO: 5 %
NEUTROPHILS # BLD AUTO: 4.8 10E3/UL (ref 1.6–8.3)
NEUTROPHILS NFR BLD AUTO: 68 %
NRBC # BLD AUTO: 0 10E3/UL
NRBC BLD AUTO-RTO: 0 /100
PLATELET # BLD AUTO: 295 10E3/UL (ref 150–450)
POTASSIUM SERPL-SCNC: 3.9 MMOL/L (ref 3.4–5.3)
PROT SERPL-MCNC: 7.8 G/DL (ref 6.4–8.3)
RBC # BLD AUTO: 4.46 10E6/UL (ref 3.8–5.2)
SODIUM SERPL-SCNC: 138 MMOL/L (ref 136–145)
TROPONIN T SERPL HS-MCNC: <6 NG/L
WBC # BLD AUTO: 7 10E3/UL (ref 4–11)

## 2023-07-20 PROCEDURE — 80053 COMPREHEN METABOLIC PANEL: CPT | Performed by: STUDENT IN AN ORGANIZED HEALTH CARE EDUCATION/TRAINING PROGRAM

## 2023-07-20 PROCEDURE — 85025 COMPLETE CBC W/AUTO DIFF WBC: CPT | Performed by: STUDENT IN AN ORGANIZED HEALTH CARE EDUCATION/TRAINING PROGRAM

## 2023-07-20 PROCEDURE — 99285 EMERGENCY DEPT VISIT HI MDM: CPT | Mod: 25

## 2023-07-20 PROCEDURE — 36415 COLL VENOUS BLD VENIPUNCTURE: CPT | Performed by: STUDENT IN AN ORGANIZED HEALTH CARE EDUCATION/TRAINING PROGRAM

## 2023-07-20 PROCEDURE — 85379 FIBRIN DEGRADATION QUANT: CPT | Performed by: STUDENT IN AN ORGANIZED HEALTH CARE EDUCATION/TRAINING PROGRAM

## 2023-07-20 PROCEDURE — 84484 ASSAY OF TROPONIN QUANT: CPT | Performed by: STUDENT IN AN ORGANIZED HEALTH CARE EDUCATION/TRAINING PROGRAM

## 2023-07-20 PROCEDURE — 93005 ELECTROCARDIOGRAM TRACING: CPT | Performed by: STUDENT IN AN ORGANIZED HEALTH CARE EDUCATION/TRAINING PROGRAM

## 2023-07-20 PROCEDURE — 71046 X-RAY EXAM CHEST 2 VIEWS: CPT

## 2023-07-20 RX ORDER — AZITHROMYCIN 250 MG/1
TABLET, FILM COATED ORAL
Qty: 6 TABLET | Refills: 0 | Status: SHIPPED | OUTPATIENT
Start: 2023-07-20 | End: 2023-07-25

## 2023-07-20 ASSESSMENT — ACTIVITIES OF DAILY LIVING (ADL): ADLS_ACUITY_SCORE: 35

## 2023-07-20 NOTE — ED TRIAGE NOTES
Pt to ED reporting chest pain that radiates to left shoulder, left arm and back. States that the pt is worse with deep breathing. Denies tenderness with palpation.   Reports was seen for this 2 weeks ago and was told her has a bruised chest wall. No improvement since last visit   Denies sob, diff breathing, n/v, trauma to the chest  No cardiac history        Triage Assessment       Row Name 07/20/23 0819       Triage Assessment (Adult)    Airway WDL WDL       Respiratory WDL    Respiratory WDL WDL       Skin Circulation/Temperature WDL    Skin Circulation/Temperature WDL WDL       Cardiac WDL    Cardiac WDL X;chest pain  chest pain worse with breathing       Peripheral/Neurovascular WDL    Peripheral Neurovascular WDL WDL       Cognitive/Neuro/Behavioral WDL    Cognitive/Neuro/Behavioral WDL WDL

## 2023-07-20 NOTE — ED PROVIDER NOTES
Emergency Department Encounter         FINAL IMPRESSION:  Chest pain        ED COURSE AND MEDICAL DECISION MAKING       ED Course as of 07/20/23 1721   Thu Jul 20, 2023   1550 23-year-old history of bipolar disorder here with chest pain for last couple weeks.  No fevers, chills, nausea vomiting.  Increased shortness of breath.  Mild pleuritic pain.  No hemoptysis.  Is on birth control.  No leg swelling.  On arrival she looks well.  Heart and lungs normal.  Pleuritic pain.  Was seen at [facility] last week with a negative chest x-ray and bedside ultrasound.  No blood work was drawn.  Plan for labs EKG D-dimer reevaluate   1615 EKG sinus 60 no signs acute ischemia, no inversions no depressions QTc 388, no signs of malignant arrhythmias.   Chest x-ray showing new infiltrate.  Although she does not fit the typical pneumonia picture, because of her vaping, atypical infection possibility.  Plan for azithromycin and discharged home.  She is otherwise hemodynamically stable.  D-dimer negative.  Troponin negative.    3:24 PM I met with the patient to obtain patient history and performed a physical exam. Discussed plan for ED work up including potential diagnostic studies and interventions.  5:20 PM Reevaluated and updated the patient with findings. We discussed the plan for discharge and the patient is agreeable. Reviewed supportive cares, symptomatic treatment, outpatient follow up, and reasons to return to the Emergency Department. Patient to be discharged by ED RN.                 Medical Decision Making    History:    Supplemental history from: Documented in chart, if applicable    External Record(s) reviewed: Documented in chart, if applicable.    Work Up:    Chart documentation includes differential considered and any EKGs or imaging independently interpreted by provider, where specified.    In additional to work up documented, I considered the following work up: Documented in chart, if applicable.    External  consultation:    Discussion of management with another provider: Documented in chart, if applicable    Complicating factors:    Care impacted by chronic illness: Mental Health    Care affected by social determinants of health: N/A    Disposition considerations: Discharge. I prescribed additional prescription strength medication(s) as charted. See documentation for any additional details.                  EKG  EKG sinus 60 no signs acute ischemia, no inversions no depressions QTc 388, no signs of malignant arrhythmias.      Critical Care     Performed by: Umair Mora or    Authorized by: Umair Mora  Total critical care time:  minutes  Critical care was necessary to treat or prevent imminent or life-threatening deterioration of the following conditions:   Critical care was time spent personally by me on the following activities: development of treatment plan with patient or surrogate, discussions with consultants, examination of patient, evaluation of patient's response to treatment, obtaining history from patient or surrogate, ordering and performing treatments and interventions, ordering and review of laboratory studies, ordering and review of radiographic studies, re-evaluation of patient's condition and monitoring for potential decompensation.  Critical care time was exclusive of separately billable procedures and treating other patients.'    At the conclusion of the encounter I discussed the results of all the tests and the disposition. The questions were answered. The patient or family acknowledged understanding and was agreeable with the care plan.                  MEDICATIONS GIVEN IN THE EMERGENCY DEPARTMENT:  Medications - No data to display    NEW PRESCRIPTIONS STARTED AT TODAY'S ED VISIT:  New Prescriptions    AZITHROMYCIN (ZITHROMAX) 250 MG TABLET    Take 2 tablets (500 mg) by mouth daily for 1 day, THEN 1 tablet (250 mg) daily for 4 days.       HPI     Patient information obtained from: patient    Use of  "Interpretor: N/A     Cornelio Ramos is a 23 year old female with a pertinent history of OSKAR, bipolar 1 disorder, who presents to this ED via walk-in with friend for evaluation of chest pain.    Patient reports persistent progressively worsening chest pain for the past 9-10 days. She says the pain is exacerbated with inspiration but not with exertion. She says it feels like she needs to \"pant to breath\" secondary to pain. She was seen at the [place] ED about 2 weeks ago for the same problem and notes no improvements since then.    She otherwise denies associating change in bowel and urinary habits, lower extremity edema, fever, and shortness of breath. She has some dry cough at baseline which remains unchanged. She is on birth control, Depo shot.    There were no other concerns/complaints at this time.    SHx: She vapes.    REVIEW OF SYSTEMS:  Review of Systems   Constitutional: Negative for fever, malaise  HEENT: Negative runny nose, sore throat, ear pain, neck pain  Respiratory: Negative for shortness of breath, cough, congestion  Cardiovascular: Endorses chest pain. Negative for leg edema  Gastrointestinal: Negative for abdominal distention, abdominal pain, constipation, vomiting, nausea, diarrhea  Genitourinary: Negative for dysuria and hematuria.   Integument: Negative for rash, skin breakdown  Neurological: Negative for paresthesias, weakness, headache.  Musculoskeletal: Negative for joint pain, joint swelling      All other systems reviewed and are negative.          MEDICAL HISTORY     Past Medical History:   Diagnosis Date     ADHD      Anxiety      Depression        Past Surgical History:   Procedure Laterality Date     LAPAROSCOPIC APPENDECTOMY N/A 10/14/2022    Procedure: APPENDECTOMY, LAPAROSCOPIC;  Surgeon: Shawn Gonzalez MD;  Location: [place] OR     [facility]         Social History     Tobacco Use     Smoking status: Never     Smokeless tobacco: Never   Substance Use Topics     Alcohol use: No     " "Drug use: No       azithromycin (ZITHROMAX) 250 MG tablet  albuterol (PROAIR HFA) 108 (90 BASE) MCG/ACT Inhaler  ESCITALOPRAM OXALATE PO  JUNEL FE 1.5/30 1.5-30 MG-MCG tablet  oxyCODONE (ROXICODONE) 5 MG tablet  sertraline (ZOLOFT) 100 MG tablet            PHYSICAL EXAM     /62   Pulse 60   Temp 97.9  F (36.6  C) (Oral)   Resp 16   Ht 1.549 m (5' 1\")   Wt 46.7 kg (103 lb)   SpO2 97%   BMI 19.46 kg/m        PHYSICAL EXAM:     General: Patient appears well, nontoxic, comfortable  HEENT: Moist mucous membranes,  No head trauma.    Cardiovascular: Normal rate, normal rhythm, no extremity edema.  No appreciable murmur.  Respiratory: No signs of respiratory distress, lungs are clear to auscultation bilaterally with no wheezes rhonchi or rales.  Abdominal: Soft, nontender, nondistended, no palpable masses, no guarding, no rebound  Musculoskeletal: Full range of motion of joints, no deformities appreciated.  Neurological: Alert and oriented, grossly neurologically intact.  Psychological: Normal affect and mood.  Integument: No rashes appreciated          RESULTS       Labs Ordered and Resulted from Time of ED Arrival to Time of ED Departure   COMPREHENSIVE METABOLIC PANEL - Abnormal       Result Value    Sodium 138      Potassium 3.9      Chloride 102      Carbon Dioxide (CO2) 23      Anion Gap 13      Urea Nitrogen 7.4      Creatinine 0.60      Calcium 9.9      Glucose 132 (*)     Alkaline Phosphatase 95      AST 24      ALT 10      Protein Total 7.8      Albumin 4.6      Bilirubin Total 0.8      GFR Estimate >90     D DIMER QUANTITATIVE - Normal    D-Dimer Quantitative 0.29     TROPONIN T, HIGH SENSITIVITY - Normal    Troponin T, High Sensitivity <6     CBC WITH PLATELETS AND DIFFERENTIAL    WBC Count 7.0      RBC Count 4.46      Hemoglobin 13.8      Hematocrit 39.8      MCV 89      MCH 30.9      MCHC 34.7      RDW 11.6      Platelet Count 295      % Neutrophils 68      % Lymphocytes 25      % Monocytes 5   "    % Eosinophils 1      % Basophils 1      % Immature Granulocytes 0      NRBCs per 100 WBC 0      Absolute Neutrophils 4.8      Absolute Lymphocytes 1.7      Absolute Monocytes 0.4      Absolute Eosinophils 0.1      Absolute Basophils 0.1      Absolute Immature Granulocytes 0.0      Absolute NRBCs 0.0         Chest XR,  PA & LAT   Final Result   IMPRESSION: New focal airspace opacification anterior segment right upper lobe consistent with developing pneumonia if that fits with clinical history. Lungs otherwise clear. No pleural effusion. Heart size normal.                        PROCEDURES:  Procedures:  Procedures       I, Danielle Maza am serving as a scribe to document services personally performed by Umair Mora DO, based on my observations and the provider's statements to me.  I, Umair Mora DO, attest that Danielle Maza is acting in a scribe capacity, has observed my performance of the services and has documented them in accordance with my direction.    Umair Mora DO  Emergency Medicine  [facility] EMERGENCY DEPARTMENT     Umair Mora DO  07/20/23 1482

## 2023-07-21 LAB
ATRIAL RATE - MUSE: 60 BPM
DIASTOLIC BLOOD PRESSURE - MUSE: NORMAL MMHG
INTERPRETATION ECG - MUSE: NORMAL
P AXIS - MUSE: 65 DEGREES
PR INTERVAL - MUSE: 128 MS
QRS DURATION - MUSE: 82 MS
QT - MUSE: 388 MS
QTC - MUSE: 388 MS
R AXIS - MUSE: 80 DEGREES
SYSTOLIC BLOOD PRESSURE - MUSE: NORMAL MMHG
T AXIS - MUSE: 63 DEGREES
VENTRICULAR RATE- MUSE: 60 BPM

## 2024-04-27 ENCOUNTER — HOSPITAL ENCOUNTER (EMERGENCY)
Facility: CLINIC | Age: 24
Discharge: HOME | End: 2024-04-27
Attending: EMERGENCY MEDICINE | Admitting: EMERGENCY MEDICINE
Payer: COMMERCIAL

## 2024-04-27 VITALS
DIASTOLIC BLOOD PRESSURE: 74 MMHG | TEMPERATURE: 98.5 F | SYSTOLIC BLOOD PRESSURE: 103 MMHG | OXYGEN SATURATION: 100 % | WEIGHT: 108 LBS | BODY MASS INDEX: 20.41 KG/M2 | RESPIRATION RATE: 16 BRPM | HEART RATE: 86 BPM

## 2024-04-27 DIAGNOSIS — H92.03 OTALGIA, BILATERAL: ICD-10-CM

## 2024-04-27 PROCEDURE — 250N000012 HC RX MED GY IP 250 OP 636 PS 637: Performed by: EMERGENCY MEDICINE

## 2024-04-27 PROCEDURE — 99283 EMERGENCY DEPT VISIT LOW MDM: CPT

## 2024-04-27 PROCEDURE — 99283 EMERGENCY DEPT VISIT LOW MDM: CPT | Performed by: EMERGENCY MEDICINE

## 2024-04-27 RX ADMIN — DEXAMETHASONE 10 MG: 4 TABLET ORAL at 21:56

## 2024-04-27 ASSESSMENT — ENCOUNTER SYMPTOMS
EYES NEGATIVE: 1
MUSCULOSKELETAL NEGATIVE: 1
RESPIRATORY NEGATIVE: 1
GASTROINTESTINAL NEGATIVE: 1
PSYCHIATRIC NEGATIVE: 1
CONSTITUTIONAL NEGATIVE: 1
NEUROLOGICAL NEGATIVE: 1
ALLERGIC/IMMUNOLOGIC NEGATIVE: 1
CARDIOVASCULAR NEGATIVE: 1
ENDOCRINE NEGATIVE: 1
HEMATOLOGIC/LYMPHATIC NEGATIVE: 1

## 2024-04-27 ASSESSMENT — ACTIVITIES OF DAILY LIVING (ADL): ADLS_ACUITY_SCORE: 33

## 2024-04-27 ASSESSMENT — COLUMBIA-SUICIDE SEVERITY RATING SCALE - C-SSRS
1. IN THE PAST MONTH, HAVE YOU WISHED YOU WERE DEAD OR WISHED YOU COULD GO TO SLEEP AND NOT WAKE UP?: NO
2. HAVE YOU ACTUALLY HAD ANY THOUGHTS OF KILLING YOURSELF IN THE PAST MONTH?: NO
6. HAVE YOU EVER DONE ANYTHING, STARTED TO DO ANYTHING, OR PREPARED TO DO ANYTHING TO END YOUR LIFE?: NO

## 2024-04-28 NOTE — ED TRIAGE NOTES
Pt is getting over a cold and reports bilateral ear pain.      Triage Assessment (Adult)       Row Name 04/27/24 1932          Triage Assessment    Airway WDL WDL        Respiratory WDL    Respiratory WDL WDL        Skin Circulation/Temperature WDL    Skin Circulation/Temperature WDL WDL        Cardiac WDL    Cardiac WDL WDL        Peripheral/Neurovascular WDL    Peripheral Neurovascular WDL WDL        Cognitive/Neuro/Behavioral WDL    Cognitive/Neuro/Behavioral WDL WDL

## 2024-04-28 NOTE — DISCHARGE INSTRUCTIONS
1) Your evaluation today did not reveal evidence of an ear infection.  With symptoms preceded by cold I suspect your ear pressure and discomfort more prominent on the left is likely due to an effusion which is fluid behind the eardrum.  To help with your symptoms you received a dose of steroids.    2) we have discussed trialing pseudoephedrine to help with the congestion.  You may also consider Zyrtec 1 to 2 tablets daily over the next 5 days.    3) You reported no hearing changes and no tinnitus.  We discussed if you develop drainage out of the ear, or any new concerns you should be reevaluated

## 2024-04-28 NOTE — ED PROVIDER NOTES
History     Chief Complaint   Patient presents with    Otalgia     HPI  Cornelio Ramos is a 24 year old female who who presents with report of bilateral ear pain.  He reports he developed a cold.    Reviewed the medical record including visit on 3/20/2024 and 2/7/2024-patient has a history of anxiety, ADHD.    On examination patient reports over the last week she has had a cold.  In the last few days she has had ear pressure.  Left ear worse than right.  She has a history of ear infections and wanted to come in to be evaluated.  No active medications.  She reports shortness of breath manage multiple coworkers and because of eczema symptoms no loss of hearing or hearing change no tinnitus.  No headache no sinus pressure.       Allergies:  Allergies   Allergen Reactions    Amoxicillin        Problem List:    Patient Active Problem List    Diagnosis Date Noted    No significant medical problems 01/01/2022     Priority: Medium    Knee pain 04/24/2014     Priority: Medium    Short stature 09/06/2013     Priority: Medium    ADHD, predominantly inattentive type 06/29/2012     Priority: Medium    Generalized anxiety disorder 06/29/2012     Priority: Medium        Past Medical History:    Past Medical History:   Diagnosis Date    ADHD     Anxiety     Depression        Past Surgical History:    Past Surgical History:   Procedure Laterality Date    LAPAROSCOPIC APPENDECTOMY N/A 10/14/2022    Procedure: APPENDECTOMY, LAPAROSCOPIC;  Surgeon: Shawn Gonzalez MD;  Location: [place] OR    PE TUBES         Family History:    Family History   Problem Relation Age of Onset    Allergies Father         seasonal    Psoriasis Paternal Grandfather     Lupus Other         Extended maternal family members    Rheumatoid Arthritis Other         Extended maternal family members    Psoriasis Paternal Aunt     Psoriasis Cousin        Social History:  Marital Status:  Single [1]  Social History     Tobacco Use    Smoking status: Never     Smokeless tobacco: Never   Substance Use Topics    Alcohol use: No    Drug use: No        Medications:    albuterol (PROAIR HFA) 108 (90 BASE) MCG/ACT Inhaler  ESCITALOPRAM OXALATE PO  JUNEL FE 1.5/30 1.5-30 MG-MCG tablet  oxyCODONE (ROXICODONE) 5 MG tablet  sertraline (ZOLOFT) 100 MG tablet          Review of Systems   Constitutional: Negative.    HENT:  Positive for ear pain.    Eyes: Negative.    Respiratory: Negative.     Cardiovascular: Negative.    Gastrointestinal: Negative.    Endocrine: Negative.    Genitourinary: Negative.    Musculoskeletal: Negative.    Allergic/Immunologic: Negative.    Neurological: Negative.    Hematological: Negative.    Psychiatric/Behavioral: Negative.     All other systems reviewed and are negative.      Physical Exam   BP: 103/74  Pulse: 86  Temp: 98.5  F (36.9  C)  Resp: 16  Weight: 49 kg (108 lb)  SpO2: 100 %      Physical Exam  Constitutional:       General: She is not in acute distress.     Appearance: Normal appearance. She is not ill-appearing, toxic-appearing or diaphoretic.   HENT:      Head: Normocephalic and atraumatic.      Nose: Nose normal.   Eyes:      Extraocular Movements: Extraocular movements intact.      Pupils: Pupils are equal, round, and reactive to light.   Cardiovascular:      Rate and Rhythm: Normal rate and regular rhythm.   Pulmonary:      Effort: Pulmonary effort is normal. No respiratory distress.      Breath sounds: Normal breath sounds. No stridor. No wheezing, rhonchi or rales.   Chest:      Chest wall: No tenderness.   Musculoskeletal:      Cervical back: Normal range of motion and neck supple.   Skin:     Capillary Refill: Capillary refill takes less than 2 seconds.      Coloration: Skin is not jaundiced or pale.      Findings: No bruising, erythema, lesion or rash.   Neurological:      General: No focal deficit present.      Mental Status: She is oriented to person, place, and time.      Cranial Nerves: No cranial nerve deficit.      Sensory: No  sensory deficit.      Motor: No weakness.      Coordination: Coordination normal.      Gait: Gait normal.      Deep Tendon Reflexes: Reflexes normal.   Psychiatric:         Mood and Affect: Mood normal.         Behavior: Behavior normal.         Thought Content: Thought content normal.         Judgment: Judgment normal.         ED Course        Procedures              Critical Care time:  none             ED medications:   Medications   dexAMETHasone (DECADRON) tablet 10 mg (10 mg Oral $Given 4/27/24 3326)     ED Vitals;  Vitals:    04/27/24 1933   BP: 103/74   Pulse: 86   Resp: 16   Temp: 98.5  F (36.9  C)   TempSrc: Oral   SpO2: 100%   Weight: 49 kg (108 lb)      ED labs and imaging; none        Assessments & Plan (with Medical Decision Making)   Assessment Summary and clinical impression: 34-year-old female who presented with bilateral otalgia after developing a cold.  She is afebrile on arrival blood pressure was 103/74.  100% on room air.  Patient has had a cold for about a week he developed ear pressure left worse than right.  No tinnitus no hearing change no sinus pressure.  Multiple sick contacts at work.  On exam a small middle ear effusion on the left.  No drainage no perforation.  Neck was supple.  No midface tenderness.  She was offered dose of dexamethasone advised to trial over-the-counter decongestions including Sudafed.  Low threshold to return if there are new concerns    ED course and plan:  Reviewed the medical record.  Reviewed visit on 3/20/2024, 2/7/2024.  We discussed suspicion that her ear discomfort is due to middle ear effusion due to recent URI symptoms.  On exam there is low concern for sinusitis with tympanic membrane perforation.  She was offered dose of dexamethasone and discharged with plan for watchful waiting and supportive care measures with plan to return if there is persistent symptoms or new concerns      Disclaimer: This note consists of symbols derived from keyboarding,  dictation and/or voice recognition software. As a result, there may be errors in the script that have gone undetected. Please consider this when interpreting information found in this chart.   I have reviewed the nursing notes.    I have reviewed the findings, diagnosis, plan and need for follow up with the patient.           Medical Decision Making  The patient's presentation was of moderate complexity.    The patient's evaluation involved: Physical examination      The patient's management necessitated only low risk treatment.        New Prescriptions    No medications on file       Final diagnoses:   Otalgia, bilateral - Preceded by 1 week history of URI symptoms       4/27/2024   [facility] EMERGENCY DEPT       Regan Finley MD  04/27/24 0058

## 2025-06-02 ENCOUNTER — HOSPITAL ENCOUNTER (EMERGENCY)
Facility: CLINIC | Age: 25
Discharge: HOME | End: 2025-06-02
Payer: COMMERCIAL

## 2025-06-02 VITALS
TEMPERATURE: 99.1 F | SYSTOLIC BLOOD PRESSURE: 104 MMHG | RESPIRATION RATE: 18 BRPM | DIASTOLIC BLOOD PRESSURE: 76 MMHG | HEART RATE: 90 BPM | OXYGEN SATURATION: 98 %

## 2025-06-02 DIAGNOSIS — J02.0 STREP PHARYNGITIS: ICD-10-CM

## 2025-06-02 LAB — S PYO DNA THROAT QL NAA+PROBE: DETECTED

## 2025-06-02 PROCEDURE — 87651 STREP A DNA AMP PROBE: CPT

## 2025-06-02 PROCEDURE — G0463 HOSPITAL OUTPT CLINIC VISIT: HCPCS

## 2025-06-02 PROCEDURE — 99213 OFFICE O/P EST LOW 20 MIN: CPT

## 2025-06-02 RX ORDER — DULOXETIN HYDROCHLORIDE 20 MG/1
40 CAPSULE, DELAYED RELEASE ORAL DAILY
COMMUNITY
Start: 2024-09-13

## 2025-06-02 RX ORDER — AZITHROMYCIN 250 MG/1
TABLET, FILM COATED ORAL
Qty: 6 TABLET | Refills: 0 | Status: SHIPPED | OUTPATIENT
Start: 2025-06-02 | End: 2025-06-07

## 2025-06-02 RX ORDER — HYDROXYZINE HYDROCHLORIDE 25 MG/1
25-50 TABLET, FILM COATED ORAL
COMMUNITY
Start: 2025-05-19

## 2025-06-02 ASSESSMENT — COLUMBIA-SUICIDE SEVERITY RATING SCALE - C-SSRS
6. HAVE YOU EVER DONE ANYTHING, STARTED TO DO ANYTHING, OR PREPARED TO DO ANYTHING TO END YOUR LIFE?: NO
1. IN THE PAST MONTH, HAVE YOU WISHED YOU WERE DEAD OR WISHED YOU COULD GO TO SLEEP AND NOT WAKE UP?: NO
2. HAVE YOU ACTUALLY HAD ANY THOUGHTS OF KILLING YOURSELF IN THE PAST MONTH?: NO

## 2025-06-02 ASSESSMENT — ACTIVITIES OF DAILY LIVING (ADL): ADLS_ACUITY_SCORE: 41

## 2025-06-02 NOTE — DISCHARGE INSTRUCTIONS
Self-care:   -Continue tylenol and ibuprofen.  Alternate these medications every three hours as needed for fever.  (For example, tylenol at 8am, ibuprofen at 11am, tylenol at 2pm, ibuprofen at 5pm, tylenol at 8pm...).  -Drink Plenty of Liquids: Cold or warm drinks may help soothe your throat. Drinking liquids will also help prevent dehydration.  -Gargle Salt Water: Mix   teaspoon salt in a glass of warm water and gargle. This may help reduce pain and swelling in your throat.  -Lemon can help sooth the throat pain. Lemon drops.  -Chloraseptic Spray/Lozenges: Available over the counter; will cause numbing sensation temporarily to soothe your sore throat.  -Chamomile tea with Honey and lemon: Chamomile may help with an upset stomach, honey may help control the cough, lemon can help sooth the throat.  -Humidify Room: Use a cool-steam humidifier to help moisten the air in your room and calm your cough.   -Other: Cough drops, ice, soft foods, or popsicles may be helpful.

## 2025-06-02 NOTE — ED PROVIDER NOTES
Chief Complaint:   Chief Complaint   Patient presents with    Pharyngitis         HPI:   Cornelio Ramos is a 25 year old female who presents to  for evaluation of sore throat and headache. Symptoms initially began 2 to 3 days ago.  She has not tried any medications for her symptoms yet.  Denies ill contacts.   She denies aching, chest congestion, chest pain, cough, decreased appetite, decreased urine output, ear pain, fatigue, fever, nasal congestion, nausea, shortness of breath, vomiting, and wheezing.     Problem List:    Patient Active Problem List    Diagnosis Date Noted    No significant medical problems 01/01/2022     Priority: Medium    Knee pain 04/24/2014     Priority: Medium    Short stature 09/06/2013     Priority: Medium    ADHD, predominantly inattentive type 06/29/2012     Priority: Medium    Generalized anxiety disorder 06/29/2012     Priority: Medium        Past Medical History:    Past Medical History:   Diagnosis Date    ADHD     Anxiety     Depression        Past Surgical History:    Past Surgical History:   Procedure Laterality Date    LAPAROSCOPIC APPENDECTOMY N/A 10/14/2022    Procedure: APPENDECTOMY, LAPAROSCOPIC;  Surgeon: Shawn Gonzalez MD;  Location: [place] OR    [facility]         Meds:   Current Outpatient Medications   Medication Sig Dispense Refill    DULoxetine (CYMBALTA) 20 MG capsule Take 40 mg by mouth daily.      hydrOXYzine HCl (ATARAX) 25 MG tablet Take 25-50 mg by mouth.      albuterol (PROAIR HFA) 108 (90 BASE) MCG/ACT Inhaler Inhale 2 puffs into the lungs every 4 hours as needed for shortness of breath / dyspnea 1 Inhaler 0    azithromycin (ZITHROMAX) 250 MG tablet Take 2 tablets (500 mg) by mouth daily for 1 day, THEN 1 tablet (250 mg) daily for 4 days. 6 tablet 0    ESCITALOPRAM OXALATE PO Take 10 mg by mouth daily      JUNEL FE 1.5/30 1.5-30 MG-MCG tablet Take 1 tablet by mouth daily      oxyCODONE (ROXICODONE) 5 MG tablet Take 1-2 tablets (5-10 mg) by mouth every 4  hours as needed for moderate to severe pain 10 tablet 0    sertraline (ZOLOFT) 100 MG tablet Take 100 mg by mouth daily         Allergies:   Allergies   Allergen Reactions    Amoxicillin        Medications updated and reviewed.  Past, family and surgical history is updated and reviewed in the record.     Review of Systems:  Pertinent review of systems as documented per HPI above.    Physical Exam:   /76   Pulse 90   Temp 99.1  F (37.3  C) (Tympanic)   Resp 18   SpO2 98%    General: alert and no acute distress  Eyes: negative, conjunctivae not injected /corneas clear. PERRL, EOM's intact.  Ears: negative, External ears normal. Canals clear. TM's normal.  Nose: Nares normal and no rhinorrhea  Mouth/Throat: moist mucus membranes, moderate oropharyngeal erythema without exudate.  No PTA.  Voice is not muffled.  Tolerates secretions.  Neck: Neck supple, tender anterior cervical lymphadenopathy present.  Chest/Pulmonary: CTAB without wheezes, stridor, or rhonchi. No signs of respiratory distress.  Cardiovascular: RRR normal S1S2  Abdomen: Bowel sounds normoactive, abdomen soft without tenderness, guarding or rigidity. No HSM.   Skin:  Skin color, texture, turgor normal. No rashes or lesions.    Results for orders placed or performed during the hospital encounter of 06/02/25 (from the past 48 hours)   Group A Streptococcus PCR Throat Swab    Specimen: Throat; Swab   Result Value Ref Range    Group A strep by PCR Detected (A) Not Detected    Narrative    The Xpert Xpress Strep A test, performed on the [employer], is a rapid, qualitative in vitro diagnostic test for the detection of Streptococcus pyogenes (Group A ß-hemolytic Streptococcus, Strep A) in throat swab specimens from patients with signs and symptoms of pharyngitis. The Xpert Xpress Strep A test can be used as an aid in the diagnosis of Group A Streptococcal pharyngitis. The assay is not intended to monitor treatment for Group A  Streptococcus infections. The Xpert Xpress Strep A test utilizes an automated real-time polymerase chain reaction (PCR) to detect Streptococcus pyogenes DNA.       Assessment:  Strep pharyngitis    Plan:   25-year-old female presents for evaluation of sore throat and headache that began 2 days ago.    Patient well-appearing on arrival, afebrile with other vital signs stable.  Examination notable for moderate oropharyngeal erythema, with associated tender anterior cervical lymphadenopathy.  Remainder of exam reassuring.  Strep test positive.  Treatment sent with azithromycin given amoxicillin allergy.  Supportive care is also recommended. Advised that if symptoms are not improving despite this treatment plan, then they should follow-up with primary provider for reevaluation. Strict UC/ED return precautions discussed in detail including prolonged fevers, development of shortness of breath or trouble with breathing, weakness or lightheadedness, inability to tolerate oral intake or anything else that is concerning to them. All questions were answered. Pt verbalized understanding and agreement with the above plan.     Condition on disposition: Stable    Disclaimer: This note consists of symbols derived from keyboarding, dictation, and/or voice recognition software. As a result, there may be errors in the script that have gone undetected.  Please consider this when interpreting information found in the chart.         Shereen Pereira PA-C  06/02/25 [id number]

## 2025-06-30 ENCOUNTER — APPOINTMENT (OUTPATIENT)
Dept: CT IMAGING | Facility: CLINIC | Age: 25
End: 2025-06-30
Attending: NURSE PRACTITIONER
Payer: COMMERCIAL

## 2025-06-30 ENCOUNTER — HOSPITAL ENCOUNTER (EMERGENCY)
Facility: CLINIC | Age: 25
Discharge: HOME | End: 2025-06-30
Attending: NURSE PRACTITIONER
Payer: COMMERCIAL

## 2025-06-30 VITALS
RESPIRATION RATE: 18 BRPM | DIASTOLIC BLOOD PRESSURE: 76 MMHG | HEART RATE: 60 BPM | SYSTOLIC BLOOD PRESSURE: 107 MMHG | BODY MASS INDEX: 21.92 KG/M2 | WEIGHT: 116 LBS | OXYGEN SATURATION: 97 % | TEMPERATURE: 97 F

## 2025-06-30 DIAGNOSIS — N20.1 RIGHT URETERAL STONE: ICD-10-CM

## 2025-06-30 DIAGNOSIS — N39.0 UTI (URINARY TRACT INFECTION): ICD-10-CM

## 2025-06-30 DIAGNOSIS — R10.9 RIGHT FLANK PAIN: ICD-10-CM

## 2025-06-30 LAB
ALBUMIN UR-MCNC: NEGATIVE MG/DL
ANION GAP SERPL CALCULATED.3IONS-SCNC: 14 MMOL/L (ref 7–15)
APPEARANCE UR: ABNORMAL
BACTERIA #/AREA URNS HPF: ABNORMAL /HPF
BASOPHILS # BLD AUTO: 0.1 10E3/UL (ref 0–0.2)
BASOPHILS NFR BLD AUTO: 1 %
BILIRUB UR QL STRIP: NEGATIVE
BUN SERPL-MCNC: 8.7 MG/DL (ref 6–20)
CALCIUM SERPL-MCNC: 9.6 MG/DL (ref 8.8–10.4)
CAOX CRY #/AREA URNS HPF: ABNORMAL /HPF
CHLORIDE SERPL-SCNC: 105 MMOL/L (ref 98–107)
COLOR UR AUTO: ABNORMAL
CREAT SERPL-MCNC: 0.71 MG/DL (ref 0.51–0.95)
EGFRCR SERPLBLD CKD-EPI 2021: >90 ML/MIN/1.73M2
EOSINOPHIL # BLD AUTO: 0.1 10E3/UL (ref 0–0.7)
EOSINOPHIL NFR BLD AUTO: 1 %
ERYTHROCYTE [DISTWIDTH] IN BLOOD BY AUTOMATED COUNT: 12 % (ref 10–15)
GLUCOSE SERPL-MCNC: 100 MG/DL (ref 70–99)
GLUCOSE UR STRIP-MCNC: NEGATIVE MG/DL
HCG SERPL QL: NEGATIVE
HCO3 SERPL-SCNC: 23 MMOL/L (ref 22–29)
HCT VFR BLD AUTO: 40.3 % (ref 35–47)
HGB BLD-MCNC: 13.8 G/DL (ref 11.7–15.7)
HGB UR QL STRIP: ABNORMAL
IMM GRANULOCYTES # BLD: 0 10E3/UL
IMM GRANULOCYTES NFR BLD: 0 %
KETONES UR STRIP-MCNC: 40 MG/DL
LEUKOCYTE ESTERASE UR QL STRIP: ABNORMAL
LYMPHOCYTES # BLD AUTO: 2.2 10E3/UL (ref 0.8–5.3)
LYMPHOCYTES NFR BLD AUTO: 20 %
MCH RBC QN AUTO: 31.7 PG (ref 26.5–33)
MCHC RBC AUTO-ENTMCNC: 34.2 G/DL (ref 31.5–36.5)
MCV RBC AUTO: 92 FL (ref 78–100)
MONOCYTES # BLD AUTO: 0.6 10E3/UL (ref 0–1.3)
MONOCYTES NFR BLD AUTO: 5 %
MUCOUS THREADS #/AREA URNS LPF: PRESENT /LPF
NEUTROPHILS # BLD AUTO: 8.1 10E3/UL (ref 1.6–8.3)
NEUTROPHILS NFR BLD AUTO: 73 %
NITRATE UR QL: NEGATIVE
NRBC # BLD AUTO: 0 10E3/UL
NRBC BLD AUTO-RTO: 0 /100
PH UR STRIP: 6 [PH] (ref 5–7)
PLATELET # BLD AUTO: 327 10E3/UL (ref 150–450)
POTASSIUM SERPL-SCNC: 4.1 MMOL/L (ref 3.4–5.3)
RBC # BLD AUTO: 4.36 10E6/UL (ref 3.8–5.2)
RBC URINE: 98 /HPF
SODIUM SERPL-SCNC: 142 MMOL/L (ref 135–145)
SP GR UR STRIP: 1.01 (ref 1–1.03)
SQUAMOUS EPITHELIAL: 23 /HPF
UROBILINOGEN UR STRIP-MCNC: NORMAL MG/DL
WBC # BLD AUTO: 11.1 10E3/UL (ref 4–11)
WBC URINE: 20 /HPF

## 2025-06-30 PROCEDURE — 85048 AUTOMATED LEUKOCYTE COUNT: CPT | Performed by: NURSE PRACTITIONER

## 2025-06-30 PROCEDURE — 87088 URINE BACTERIA CULTURE: CPT | Performed by: NURSE PRACTITIONER

## 2025-06-30 PROCEDURE — 81003 URINALYSIS AUTO W/O SCOPE: CPT | Performed by: NURSE PRACTITIONER

## 2025-06-30 PROCEDURE — 250N000013 HC RX MED GY IP 250 OP 250 PS 637: Performed by: EMERGENCY MEDICINE

## 2025-06-30 PROCEDURE — 82435 ASSAY OF BLOOD CHLORIDE: CPT | Performed by: NURSE PRACTITIONER

## 2025-06-30 PROCEDURE — 99284 EMERGENCY DEPT VISIT MOD MDM: CPT | Mod: FS | Performed by: EMERGENCY MEDICINE

## 2025-06-30 PROCEDURE — 36415 COLL VENOUS BLD VENIPUNCTURE: CPT | Performed by: NURSE PRACTITIONER

## 2025-06-30 PROCEDURE — 250N000011 HC RX IP 250 OP 636: Performed by: NURSE PRACTITIONER

## 2025-06-30 PROCEDURE — 80048 BASIC METABOLIC PNL TOTAL CA: CPT | Performed by: NURSE PRACTITIONER

## 2025-06-30 PROCEDURE — 74176 CT ABD & PELVIS W/O CONTRAST: CPT

## 2025-06-30 PROCEDURE — 84703 CHORIONIC GONADOTROPIN ASSAY: CPT | Performed by: NURSE PRACTITIONER

## 2025-06-30 PROCEDURE — 81001 URINALYSIS AUTO W/SCOPE: CPT | Performed by: NURSE PRACTITIONER

## 2025-06-30 PROCEDURE — 258N000003 HC RX IP 258 OP 636: Performed by: NURSE PRACTITIONER

## 2025-06-30 PROCEDURE — 96361 HYDRATE IV INFUSION ADD-ON: CPT

## 2025-06-30 PROCEDURE — 85004 AUTOMATED DIFF WBC COUNT: CPT | Performed by: NURSE PRACTITIONER

## 2025-06-30 PROCEDURE — 96375 TX/PRO/DX INJ NEW DRUG ADDON: CPT

## 2025-06-30 PROCEDURE — 99285 EMERGENCY DEPT VISIT HI MDM: CPT | Mod: 25

## 2025-06-30 PROCEDURE — 96374 THER/PROPH/DIAG INJ IV PUSH: CPT

## 2025-06-30 RX ORDER — TAMSULOSIN HYDROCHLORIDE 0.4 MG/1
0.4 CAPSULE ORAL DAILY
Status: DISCONTINUED | OUTPATIENT
Start: 2025-07-01 | End: 2025-06-30 | Stop reason: DRUGHIGH

## 2025-06-30 RX ORDER — OXYCODONE HYDROCHLORIDE 5 MG/1
5 TABLET ORAL EVERY 6 HOURS PRN
Qty: 10 TABLET | Refills: 0 | Status: SHIPPED | OUTPATIENT
Start: 2025-06-30

## 2025-06-30 RX ORDER — DIPHENHYDRAMINE HYDROCHLORIDE 50 MG/ML
25 INJECTION, SOLUTION INTRAMUSCULAR; INTRAVENOUS ONCE
Status: COMPLETED | OUTPATIENT
Start: 2025-06-30 | End: 2025-06-30

## 2025-06-30 RX ORDER — ONDANSETRON 4 MG/1
4 TABLET, ORALLY DISINTEGRATING ORAL EVERY 8 HOURS PRN
Qty: 10 TABLET | Refills: 0 | Status: SHIPPED | OUTPATIENT
Start: 2025-06-30 | End: 2025-07-03

## 2025-06-30 RX ORDER — OXYCODONE AND ACETAMINOPHEN 5; 325 MG/1; MG/1
2 TABLET ORAL ONCE
Refills: 0 | Status: COMPLETED | OUTPATIENT
Start: 2025-06-30 | End: 2025-06-30

## 2025-06-30 RX ORDER — TAMSULOSIN HYDROCHLORIDE 0.4 MG/1
0.4 CAPSULE ORAL DAILY
Qty: 10 CAPSULE | Refills: 0 | Status: SHIPPED | OUTPATIENT
Start: 2025-06-30 | End: 2025-07-10

## 2025-06-30 RX ORDER — KETOROLAC TROMETHAMINE 30 MG/ML
30 INJECTION, SOLUTION INTRAMUSCULAR; INTRAVENOUS ONCE
Status: COMPLETED | OUTPATIENT
Start: 2025-06-30 | End: 2025-06-30

## 2025-06-30 RX ORDER — TAMSULOSIN HYDROCHLORIDE 0.4 MG/1
0.4 CAPSULE ORAL ONCE
Status: COMPLETED | OUTPATIENT
Start: 2025-06-30 | End: 2025-06-30

## 2025-06-30 RX ORDER — SULFAMETHOXAZOLE AND TRIMETHOPRIM 800; 160 MG/1; MG/1
1 TABLET ORAL 2 TIMES DAILY
Qty: 20 TABLET | Refills: 0 | Status: SHIPPED | OUTPATIENT
Start: 2025-06-30 | End: 2025-07-10

## 2025-06-30 RX ADMIN — OXYCODONE HYDROCHLORIDE AND ACETAMINOPHEN 2 TABLET: 5; 325 TABLET ORAL at 22:18

## 2025-06-30 RX ADMIN — DIPHENHYDRAMINE HYDROCHLORIDE 25 MG: 50 INJECTION INTRAMUSCULAR; INTRAVENOUS at 16:57

## 2025-06-30 RX ADMIN — TAMSULOSIN HYDROCHLORIDE 0.4 MG: 0.4 CAPSULE ORAL at 22:18

## 2025-06-30 RX ADMIN — KETOROLAC TROMETHAMINE 30 MG: 30 INJECTION, SOLUTION INTRAMUSCULAR; INTRAVENOUS at 17:04

## 2025-06-30 RX ADMIN — SODIUM CHLORIDE, POTASSIUM CHLORIDE, SODIUM LACTATE AND CALCIUM CHLORIDE 1000 ML: 600; 310; 30; 20 INJECTION, SOLUTION INTRAVENOUS at 16:56

## 2025-06-30 RX ADMIN — PROCHLORPERAZINE EDISYLATE 10 MG: 5 INJECTION INTRAMUSCULAR; INTRAVENOUS at 17:00

## 2025-06-30 ASSESSMENT — ACTIVITIES OF DAILY LIVING (ADL)
ADLS_ACUITY_SCORE: 41

## 2025-06-30 NOTE — Clinical Note
Cornelio Ramos was seen and treated in our emergency department on 6/30/2025.  She may return to work on 07/03/2025.       If you have any questions or concerns, please don't hesitate to call.      Rhoda Quintana, ANA CNP

## 2025-06-30 NOTE — ED PROVIDER NOTES
/79   Pulse 79   Temp 97  F (36.1  C) (Tympanic)   Resp 18   SpO2 99%     Cornelio Ramos is a 25-year-old female presenting with complaints of severe right-sided mid back pain for the past 4 days.  Patient reports constant pain associated with nausea and vomiting.  Patient believes she has been vomiting due to the pain.  Denies hematuria or dysuria.  Does note some pain radiation into right flank and abdomen.  Given patient's history, I recommended he/she be evaluated in the emergency department.  We discussed that he/she will likely require further testing and/or treatment beyond the capabilities of the current urgent care setting including labs and potential imaging.  Patient expresses understanding of this and agreement with the plan.  I have explained what could happen if they choose to not have the treatment/transfer.        Lea Dumas PA-C  06/30/25 6123

## 2025-06-30 NOTE — ED TRIAGE NOTES
Right sided back pain that started on Friday. Vomiting started on Saturday.        Triage Assessment (Adult)       Row Name 06/30/25 164          Triage Assessment    Airway WDL WDL        Respiratory WDL    Respiratory WDL WDL        Skin Circulation/Temperature WDL    Skin Circulation/Temperature WDL WDL        Cardiac WDL    Cardiac WDL WDL        Peripheral/Neurovascular WDL    Peripheral Neurovascular WDL WDL        Cognitive/Neuro/Behavioral WDL    Cognitive/Neuro/Behavioral WDL WDL

## 2025-06-30 NOTE — ED PROVIDER NOTES
History     Chief Complaint   Patient presents with    Back Pain    Nausea & Vomiting     HPI  Corenlio Ramos is a 25 year old female who comes emergency room reporting severe right-sided low back pain with onset of symptoms 4 days ago.  Patient rates her pain 12 out of 10.  Patient notes associated nausea, vomiting.  Patient denies hematuria, dysuria.  Patient states she has not tried any therapies or treatments for this pain.  She states she has never had this pain like this similarly.  Denies history of ureteral stones  Patient denies history of inflammatory bowel disease, liver disease, gallbladder disease.  She reports she is on the Depo-Provera shot and has not missed any doses.  She states she does not get any periods due to the Depo-Provera shot.  She states she is not currently sexually active.  Patient states she is status post appendectomy.  Allergies:  Allergies   Allergen Reactions    Amoxicillin        Problem List:    Patient Active Problem List    Diagnosis Date Noted    No significant medical problems 01/01/2022     Priority: Medium    Knee pain 04/24/2014     Priority: Medium    Short stature 09/06/2013     Priority: Medium    ADHD, predominantly inattentive type 06/29/2012     Priority: Medium    Generalized anxiety disorder 06/29/2012     Priority: Medium        Past Medical History:    Past Medical History:   Diagnosis Date    ADHD     Anxiety     Depression        Past Surgical History:    Past Surgical History:   Procedure Laterality Date    LAPAROSCOPIC APPENDECTOMY N/A 10/14/2022    Procedure: APPENDECTOMY, LAPAROSCOPIC;  Surgeon: Shawn Gonzalez MD;  Location: [facility] OR    PE TUBES         Family History:    Family History   Problem Relation Age of Onset    Allergies Father         seasonal    Psoriasis Paternal Grandfather     Lupus Other         Extended maternal family members    Rheumatoid Arthritis Other         Extended maternal family members    Psoriasis Paternal Aunt      Psoriasis Cousin        Social History:  Marital Status:  Single [1]  Social History     Tobacco Use    Smoking status: Never    Smokeless tobacco: Never   Substance Use Topics    Alcohol use: No    Drug use: No        Medications:    ondansetron (ZOFRAN ODT) 4 MG ODT tab  oxyCODONE (ROXICODONE) 5 MG tablet  sulfamethoxazole-trimethoprim (BACTRIM DS) 800-160 MG tablet  tamsulosin (FLOMAX) 0.4 MG capsule  albuterol (PROAIR HFA) 108 (90 BASE) MCG/ACT Inhaler  DULoxetine (CYMBALTA) 20 MG capsule  ESCITALOPRAM OXALATE PO  hydrOXYzine HCl (ATARAX) 25 MG tablet  JUNEL FE 1.5/30 1.5-30 MG-MCG tablet  oxyCODONE (ROXICODONE) 5 MG tablet  sertraline (ZOLOFT) 100 MG tablet      Review of Systems  As mentioned above in the history present illness. All other systems were reviewed and are negative.    Physical Exam   BP: 106/79  Pulse: 79  Temp: 97  F (36.1  C)  Resp: 18  Weight: 52.6 kg (116 lb)  SpO2: 99 %      Physical Exam  Vitals and nursing note reviewed.   Constitutional:       General: She is in acute distress.      Appearance: She is well-developed. She is ill-appearing. She is not diaphoretic.   HENT:      Head: Normocephalic and atraumatic.      Right Ear: Hearing and external ear normal.      Left Ear: Hearing and external ear normal.      Nose: Nose normal.      Mouth/Throat:      Pharynx: Uvula midline.      Tonsils: No tonsillar exudate.   Eyes:      General: No scleral icterus.        Right eye: No discharge.         Left eye: No discharge.      Conjunctiva/sclera: Conjunctivae normal.   Cardiovascular:      Rate and Rhythm: Normal rate and regular rhythm.      Heart sounds: Normal heart sounds. No murmur heard.     No friction rub.   Pulmonary:      Effort: Pulmonary effort is normal. No respiratory distress.      Breath sounds: Normal breath sounds. No stridor. No wheezing or rales.   Chest:      Chest wall: No tenderness.   Abdominal:      General: Bowel sounds are normal. There is no distension.       Palpations: Abdomen is soft. There is no mass.      Tenderness: There is abdominal tenderness in the right lower quadrant. There is right CVA tenderness. There is no guarding or rebound.      Hernia: No hernia is present.   Musculoskeletal:      Cervical back: Normal range of motion and neck supple.   Skin:     General: Skin is warm and dry.      Coloration: Skin is not pale.      Findings: No erythema or rash.   Neurological:      Mental Status: She is alert and oriented to person, place, and time.   Psychiatric:         Mood and Affect: Mood normal.         ED Course     ED Course as of 07/03/25 1721   Mon Jun 30, 2025 1657 CBC with platelets differential(!)  Mild leukocytosis with stable hemoglobin we will continue workup to look for any other abnormalities   1745 Patient sleeping status post administration of Toradol, Compazine, Benadryl lactated Ringer's is infusing.  No longer vomiting.   1808 Basic metabolic panel(!)  Basic metabolic panel unremarkable.   1808 HCG qualitative Blood  Serum pregnancy test negative.   1900 Pt still sleeping     2006 UA with Microscopic reflex to Culture(!)  UA reveals moderate blood, 40+ ketones moderate leukocyte esterase, mucus present 98 red blood cells, 20+ white blood cells possible inflammatory etiology ordering CT stone protocol no evidence of infected stone at this point as patient is temp is 97. Will order Bactrim DS to cover for complicated UTI     Procedures    No results found for this or any previous visit (from the past 24 hours).      Medications   lactated ringers BOLUS 1,000 mL (0 mLs Intravenous Stopped 6/30/25 2100)   prochlorperazine (COMPAZINE) injection 10 mg (10 mg Intravenous $Given 6/30/25 1700)   diphenhydrAMINE (BENADRYL) injection 25 mg (25 mg Intravenous $Given 6/30/25 1657)   ketorolac (TORADOL) injection 30 mg (30 mg Intravenous $Given 6/30/25 1704)   oxyCODONE-acetaminophen (PERCOCET) 5-325 MG per tablet 2 tablet (2 tablets Oral $Given 6/30/25  2129)   tamsulosin (FLOMAX) capsule 0.4 mg (0.4 mg Oral $Given 6/30/25 7899)       Assessments & Plan (with Medical Decision Making)     I have reviewed the nursing notes.    I have reviewed the findings, diagnosis, plan and need for follow up with the patient.    Medical Decision Making  The patient's presentation was of moderate complexity (an undiagnosed new problem with uncertain prognosis).    The patient's evaluation involved:  review of external note(s) from 3+ sources (see separate area of note for details)  review of 3+ test result(s) ordered prior to this encounter (see separate area of note for details)  ordering and/or review of 3+ test(s) in this encounter (see separate area of note for details)  independent interpretation of testing performed by another health professional (see separate area of note for details)    The patient's management necessitated moderate risk (IV contrast administration).    25-year-old female who presents emergency room with a 4-day history of right-sided flank pain, back pain, side pain with associated with nausea and vomiting.  On exam blood pressure 106/79, temp 97, pulse 79, respiratory rate is 18, O2 saturation 99%.  Patient appears to be in acute distress she has right-sided abdominal pain most tender at the right lower quadrant and right CVA tenderness.  There is no abdominal distention-rebound, guarding.  Differential diagnoses musculoskeletal pain, ureteral stone process, complicated UTI, infected stone, cholelithiasis, colitis, gastritis.  Patient on Depo-Provera and do not believe any pregnancy related etiology but could possibly include a ovarian cyst ruptured pathology but low suspicion for this.  Workup initiated.  White blood cell count shows mild elevation could be inflammatory versus infection.  UA remarkable for blood, red blood cells, white blood cells, leukocyte esterase will initiate antibiotics, ordered stone protocol and care transferred over to Dr. Barnard  Paddy at 8:25 PM.    Discharge Medication List as of 6/30/2025 10:20 PM        START taking these medications    Details   ondansetron (ZOFRAN ODT) 4 MG ODT tab Take 1 tablet (4 mg) by mouth every 8 hours as needed for nausea or vomiting., Disp-10 tablet, R-0, E-Prescribe      !! oxyCODONE (ROXICODONE) 5 MG tablet Take 1 tablet (5 mg) by mouth every 6 hours as needed for severe pain., Disp-10 tablet, R-0, E-Prescribe      sulfamethoxazole-trimethoprim (BACTRIM DS) 800-160 MG tablet Take 1 tablet by mouth 2 times daily for 10 days., Disp-20 tablet, R-0, E-Prescribe      tamsulosin (FLOMAX) 0.4 MG capsule Take 1 capsule (0.4 mg) by mouth daily for 10 doses., Disp-10 capsule, R-0, E-Prescribe       !! - Potential duplicate medications found. Please discuss with provider.          Final diagnoses:   UTI (urinary tract infection)   Right ureteral stone   Right flank pain       6/30/2025   [facility] EMERGENCY DEPT       Mariano, Rhoda Rodriguez, ANA CNP  07/03/25 9932

## 2025-07-01 NOTE — ED PROVIDER NOTES
Physician Attestation     I saw and evaluated Cornelio Ramos as part of a shared APRN/PA visit.     I personally reviewed the vital signs, medications, labs, and imaging.    I personally provided a substantive portion of care for this patient and I approve the care plan as written by the KIMO.  I was involved with Medical Decision Making including: Please see A&P for additional details of medical decision making.    In short, 25-year-old female presenting with severe right-sided flank pain, present over the past approximately 4 days.  Patient has not had similar types of pain previously.  I personally reviewed CT imaging that shows proximal right 3 mm ureteral stone.  No other stones identified.  Upon my discussion with the patient at approximately 10:10 PM, pain is tolerable, however still present.  Has received Toradol, in addition to antiemetic medications.  I discussed expectant management, and will refer to urology.  I have prescribed oxycodone for home, in addition to Flomax.  Previous provider did discuss Bactrim in addition to Zofran with patient.  Does have some white blood cells present in the urine, however nitrite negative, and does have minimally elevated serum white blood cell count, however no fever.  I do not feel that this truly represents infected kidney stone.  Urology referral placed, instructed on adequate fluid hydration, and medications as prescribed.  Return precautions are discussed.    I also discussed findings with mother who was present in the room.  Mother is comfortable with this management as mother has history of kidney stones and mother has required stenting previously.    1. UTI (urinary tract infection)    2. Right ureteral stone    3. Right flank pain          Akil Thomason MD  Date of Service (when I saw the patient): 06/30/25       Akil Thomason MD  06/30/25 0238

## 2025-07-01 NOTE — DISCHARGE INSTRUCTIONS
Take ibuprofen, 400 mg, 4 times per day if needed for pain.  You may add acetaminophen 1000 mg 4 times per day if needed for pain.    You may add oxycodone 5 mg, 1-2 tablets up to every 6 hours if needed for pain.  Try to use this primarily only at night to help with sleep.    Do not use alcohol, operate machinery, drive, or climb on ladders, or perform other complex motor activity or make important decisions for 8 hours after taking oxycodone. Use docusate (100mg) 2 times a day to prevent constipation while on narcotics.  You may use ondansetron 4 mg up to every 6 hours if needed for nausea.  Return to the emergency department if you develop a fever greater than 100.4, vomiting, inability to take food and fluids, or worsening pain.     There is a 3 mm stone in the tube connecting the right kidney to the bladder.  Uncertain amount of time that it may take for this to pass.  Follow-up with urology.  Strain urine.  Drink plenty of fluids.      You are seen in the emergency room and evaluated for your right sided abdominal pain/flank pain.  Your urinalysis is concerning for a urinary tract infection that may be trying to reach your kidneys.  I would like to treat you with an antibiotic called Bactrim.  Please take this antibiotic twice daily for 10 days.  It is absolutely important that you drink at least 6 to 8 glasses of water daily when taking this antibiotic medication.  With your nausea I have prescribed a medication called Zofran and you can take 1 tablet by mouth every 6-8 hours as needed.  This medication may cause constipation just as a precaution.

## 2025-07-03 ENCOUNTER — TELEPHONE (OUTPATIENT)
Dept: NURSING | Facility: CLINIC | Age: 25
End: 2025-07-03
Payer: COMMERCIAL

## 2025-07-03 LAB
BACTERIA UR CULT: ABNORMAL
BACTERIA UR CULT: ABNORMAL

## 2025-07-03 NOTE — TELEPHONE ENCOUNTER
[facility]    Reason for call: Lab Result Notification     Lab Result (including Rx patient on, if applicable).  If culture, copy of lab report at bottom.  Lab Result: Urine Culture - see below     ED Rx: sulfamethoxazole-trimethoprim (BACTRIM DS) 800-160 MG tablet - Take 1 tablet by mouth 2 times daily for 10 days   50,000-100,000 CFU/mL Staphylococcus epidermidis Abnormal  (Not tested, appropriate tx according to [place] Guide)    Creatinine Level (mg/dl)   Creatinine   Date Value Ref Range Status   06/30/2025 0.71 0.51 - 0.95 mg/dL Final   08/26/2013 0.40 0.39 - 0.73 mg/dL Final    Creatinine clearance (ml/min), if applicable    Creatinine clearance cannot be calculated (Unknown ideal weight.)     ED Symptoms: Patient presented to [facility] ED on 6/30/2025 with severe right-sided flank pain    RN Recommendations/Instructions per [place] ED lab result protocol:   [facility] ED lab result protocol utilized: Urine Culture  Continue antibiotic/medication as prescribed: Bactrim    Patient's current Symptoms:   Flank pain is gone, feeling better overall      Patient/care giver notified to contact your PCP clinic or return to the Emergency department if your:  Symptoms return.  Symptoms do not resolve after completing antibiotic.  Symptoms worsen or other concerning symptoms.       Emerald Stacy RN

## 2025-07-15 NOTE — CONFIDENTIAL NOTE
Chief Complaint:   Kidney stones         History of Present Illness:   Cornelio Ramos is a 25 year old female presenting for an evaluation of kidney stones.     The patient presented to the ED on 6/30/2025 with right flank pain. Urine culture grew staph epidermidis. WBC count was 11.1. Renal function was WNL. CT abdomen pelvis was notable for a 3 mm stone in the proximal right ureter.     She reports continued intermittent flank pain. She is taking ibuprofen and acetaminophen. She denies urinary symptoms since completing antibiotics. She denies any recent nausea.     This is her first kidney stone.          Past Medical History:     Past Medical History:   Diagnosis Date    ADHD     Anxiety     Depression             Past Surgical History:     Past Surgical History:   Procedure Laterality Date    LAPAROSCOPIC APPENDECTOMY N/A 10/14/2022    Procedure: APPENDECTOMY, LAPAROSCOPIC;  Surgeon: Shawn Gonzalez MD;  Location: [facility] OR    PE TUBES              Medications     Current Outpatient Medications   Medication Sig Dispense Refill    albuterol (PROAIR HFA) 108 (90 BASE) MCG/ACT Inhaler Inhale 2 puffs into the lungs every 4 hours as needed for shortness of breath / dyspnea 1 Inhaler 0    DULoxetine (CYMBALTA) 20 MG capsule Take 40 mg by mouth daily.      ESCITALOPRAM OXALATE PO Take 10 mg by mouth daily      hydrOXYzine HCl (ATARAX) 25 MG tablet Take 25-50 mg by mouth.      JUNEL FE 1.5/30 1.5-30 MG-MCG tablet Take 1 tablet by mouth daily      oxyCODONE (ROXICODONE) 5 MG tablet Take 1 tablet (5 mg) by mouth every 6 hours as needed for severe pain. 10 tablet 0    oxyCODONE (ROXICODONE) 5 MG tablet Take 1-2 tablets (5-10 mg) by mouth every 4 hours as needed for moderate to severe pain 10 tablet 0    sertraline (ZOLOFT) 100 MG tablet Take 100 mg by mouth daily       No current facility-administered medications for this visit.            Allergies:   Amoxicillin         Review of Systems:  From intake  questionnaire   Negative 14 system review except as noted on HPI, nurse's note.         Physical Exam:   Patient is a 25 year old female evaluated via video visit.       Labs and Pathology:    I personally reviewed all applicable laboratory data and went over findings with patient  Significant for:    CBC RESULTS:  Recent Labs   Lab Test 06/30/25  1648 07/20/23  1531 10/14/22  0736   WBC 11.1* 7.0 12.4*   HGB 13.8 13.8 13.2    295 262        BMP RESULTS:  Recent Labs   Lab Test 06/30/25  1648 07/20/23  1531 10/14/22  0736    138 138   POTASSIUM 4.1 3.9 3.7   CHLORIDE 105 102 102   CO2 23 23 25   ANIONGAP 14 13 11   * 132* 85   BUN 8.7 7.4 11.4   CR 0.71 0.60 0.78   GFRESTIMATED >90 >90 >90   TATI 9.6 9.9 9.0       UA RESULTS:   Recent Labs   Lab Test 06/30/25  1942 10/14/22  0727   SG 1.014 1.020   URINEPH 6.0 6.0   NITRITE Negative Negative   RBCU 98* 1   WBCU 20* 10*         Imaging:    I personally reviewed all applicable imaging and went over findings with patient.  Significant for:    Results for orders placed or performed during the hospital encounter of 06/30/25   CT Abdomen Pelvis w/o Contrast    Narrative    EXAM: CT ABDOMEN PELVIS W/O CONTRAST  LOCATION: [facility]  DATE: 6/30/2025    INDICATION: right flank pain  COMPARISON: 10/14/2022  TECHNIQUE: CT scan of the abdomen and pelvis was performed without IV contrast. Multiplanar reformats were obtained. Dose reduction techniques were used.  CONTRAST: None.    FINDINGS:   LOWER CHEST: Normal.    HEPATOBILIARY: Cholelithiasis.    PANCREAS: Normal.    SPLEEN: Normal.    ADRENAL GLANDS: Normal.    KIDNEYS/BLADDER: Obstructing 3 mm stone within the right proximal ureter on series 3 image 77 with mild hydronephrosis.    BOWEL: No obstruction or inflammatory change. Mild colonic stool burden. Appendectomy.    LYMPH NODES: Normal.    VASCULATURE: Normal.    PELVIC ORGANS: Normal.    MUSCULOSKELETAL: Normal.       Impression    IMPRESSION:   Obstructing 3 mm right proximal ureteral stone.              Assessment and Plan:   Assessment: Cornelio Ramos is a 25 year old female seen in evaluation for a 3 mm stone in the proximal right ureter found on CT from 6/30/2025. She is feeling better, but endorses continued intermittent right flank pain.     We discussed obtaining a follow up CT scan to assess stone passage. If the stone has not progressed, we discussed URS for stone clearance. She is agreeable.       Plan:  CT abdomen pelvis without contrast to assess stone passage.         Leslie Bah PA-C  Department of Urology

## 2025-07-16 ENCOUNTER — VIRTUAL VISIT (OUTPATIENT)
Dept: UROLOGY | Facility: CLINIC | Age: 25
End: 2025-07-16
Payer: COMMERCIAL

## 2025-07-16 DIAGNOSIS — N20.1 RIGHT URETERAL STONE: ICD-10-CM

## 2025-07-16 DIAGNOSIS — R10.9 RIGHT FLANK PAIN: ICD-10-CM

## 2025-07-16 NOTE — PROGRESS NOTES
Cornelio Ramos is a 25 year old year old who is being evaluated via a billable video visit.      How would you like to obtain your AVS? Mail a copy  If the video visit is dropped, the invitation should be resent by: Text to cell phone: [phone]  Will anyone else be joining your video visit? No    Video-Visit Details    Type of service:  Video Visit     Originating Location (pt. Location): Home    Distant Location (provider location):  Off-site  Platform used for Video Visit: Agustin

## (undated) DEVICE — ADH SKIN CLOSURE PREMIERPRO EXOFIN 1.0ML 3470

## (undated) DEVICE — ESU PENCIL W/COATED BLADE E2450H

## (undated) DEVICE — STPL ENDO LINEAR CUT ARTICULATING 45MM ATS45

## (undated) DEVICE — STPL RELOAD REG TISSUE ECHELON 45 X 3.6MM BLUE GST45B

## (undated) DEVICE — PREP CHLORAPREP 26ML TINTED ORANGE  260815

## (undated) DEVICE — GOWN XLG DISP 9545

## (undated) DEVICE — SU VICRYL 0 UR-6 27" J603H

## (undated) DEVICE — Device

## (undated) DEVICE — ENDO TROCAR SLEEVE KII Z-THREADED 05X100MM CTS02

## (undated) DEVICE — ENDO POUCH UNIV RETRIEVAL SYSTEM INZII 10MM CD001

## (undated) DEVICE — CLIP APPLIER ENDO 5MM M/L LIGAMAX EL5ML

## (undated) DEVICE — GLOVE PROTEXIS W/NEU-THERA 7.5  2D73TE75

## (undated) DEVICE — STOCKING SLEEVE COMPRESSION CALF LG

## (undated) DEVICE — SYSTEM LAPAROVUE VISIBILITY LAPVUE10

## (undated) DEVICE — SU MONOCRYL 4-0 PS-2 18" UND Y496G

## (undated) DEVICE — STPL ENDO RELOAD 45X2.5MM VASC TR45W

## (undated) DEVICE — ENDO TROCAR BLUNT TIP KII BALLOON 12X100MM C0R47

## (undated) DEVICE — ENDO TROCAR FIRST ENTRY KII FIOS Z-THRD 05X100MM CTF03

## (undated) DEVICE — STPL ENDO RELOAD 45X3.5MM 6R45B

## (undated) DEVICE — ESU HOLSTER PLASTIC DISP E2400

## (undated) DEVICE — GLOVE PROTEXIS BLUE W/NEU-THERA 7.5  2D73EB75

## (undated) DEVICE — DRAPE TIBURON GENERAL ENDOSCOPY 9458

## (undated) DEVICE — DRAPE POUCH INSTRUMENT 3 POCKET 1018L

## (undated) RX ORDER — ACETAMINOPHEN 325 MG/1
TABLET ORAL
Status: DISPENSED
Start: 2022-10-14

## (undated) RX ORDER — PROPOFOL 10 MG/ML
INJECTION, EMULSION INTRAVENOUS
Status: DISPENSED
Start: 2022-10-14

## (undated) RX ORDER — DEXAMETHASONE SODIUM PHOSPHATE 4 MG/ML
INJECTION, SOLUTION INTRA-ARTICULAR; INTRALESIONAL; INTRAMUSCULAR; INTRAVENOUS; SOFT TISSUE
Status: DISPENSED
Start: 2022-10-14

## (undated) RX ORDER — BUPIVACAINE HYDROCHLORIDE AND EPINEPHRINE 5; 5 MG/ML; UG/ML
INJECTION, SOLUTION EPIDURAL; INTRACAUDAL; PERINEURAL
Status: DISPENSED
Start: 2022-10-14

## (undated) RX ORDER — GABAPENTIN 300 MG/1
CAPSULE ORAL
Status: DISPENSED
Start: 2022-10-14

## (undated) RX ORDER — OXYCODONE HYDROCHLORIDE 5 MG/1
TABLET ORAL
Status: DISPENSED
Start: 2022-10-14

## (undated) RX ORDER — LIDOCAINE HYDROCHLORIDE 10 MG/ML
INJECTION, SOLUTION EPIDURAL; INFILTRATION; INTRACAUDAL; PERINEURAL
Status: DISPENSED
Start: 2022-10-14

## (undated) RX ORDER — ONDANSETRON 2 MG/ML
INJECTION INTRAMUSCULAR; INTRAVENOUS
Status: DISPENSED
Start: 2022-10-14

## (undated) RX ORDER — HYDROMORPHONE HYDROCHLORIDE 1 MG/ML
INJECTION, SOLUTION INTRAMUSCULAR; INTRAVENOUS; SUBCUTANEOUS
Status: DISPENSED
Start: 2022-10-14

## (undated) RX ORDER — MAGNESIUM SULFATE HEPTAHYDRATE 40 MG/ML
INJECTION, SOLUTION INTRAVENOUS
Status: DISPENSED
Start: 2022-10-14

## (undated) RX ORDER — FENTANYL CITRATE 50 UG/ML
INJECTION, SOLUTION INTRAMUSCULAR; INTRAVENOUS
Status: DISPENSED
Start: 2022-10-14

## (undated) RX ORDER — FENTANYL CITRATE-0.9 % NACL/PF 10 MCG/ML
PLASTIC BAG, INJECTION (ML) INTRAVENOUS
Status: DISPENSED
Start: 2022-10-14